# Patient Record
Sex: MALE | Race: WHITE | HISPANIC OR LATINO | Employment: PART TIME | URBAN - METROPOLITAN AREA
[De-identification: names, ages, dates, MRNs, and addresses within clinical notes are randomized per-mention and may not be internally consistent; named-entity substitution may affect disease eponyms.]

---

## 2017-11-01 ENCOUNTER — OFFICE VISIT (OUTPATIENT)
Dept: PULMONOLOGY | Facility: CLINIC | Age: 62
End: 2017-11-01
Payer: COMMERCIAL

## 2017-11-01 VITALS
HEIGHT: 71 IN | OXYGEN SATURATION: 96 % | RESPIRATION RATE: 18 BRPM | SYSTOLIC BLOOD PRESSURE: 140 MMHG | DIASTOLIC BLOOD PRESSURE: 82 MMHG | BODY MASS INDEX: 30.13 KG/M2 | HEART RATE: 56 BPM | WEIGHT: 215.19 LBS

## 2017-11-01 DIAGNOSIS — G47.30 SLEEP APNEA, UNSPECIFIED TYPE: Primary | ICD-10-CM

## 2017-11-01 DIAGNOSIS — I48.19 PERSISTENT ATRIAL FIBRILLATION: ICD-10-CM

## 2017-11-01 DIAGNOSIS — E11.9 TYPE 2 DIABETES MELLITUS WITHOUT COMPLICATION, WITHOUT LONG-TERM CURRENT USE OF INSULIN: ICD-10-CM

## 2017-11-01 DIAGNOSIS — I10 HYPERTENSION, UNSPECIFIED TYPE: ICD-10-CM

## 2017-11-01 DIAGNOSIS — E78.2 MIXED HYPERLIPIDEMIA: ICD-10-CM

## 2017-11-01 PROCEDURE — 99204 OFFICE O/P NEW MOD 45 MIN: CPT | Mod: 25,S$GLB,, | Performed by: NURSE PRACTITIONER

## 2017-11-01 PROCEDURE — 90471 IMMUNIZATION ADMIN: CPT | Mod: S$GLB,,, | Performed by: INTERNAL MEDICINE

## 2017-11-01 PROCEDURE — 90686 IIV4 VACC NO PRSV 0.5 ML IM: CPT | Mod: S$GLB,,, | Performed by: INTERNAL MEDICINE

## 2017-11-01 PROCEDURE — 99999 PR PBB SHADOW E&M-NEW PATIENT-LVL IV: CPT | Mod: PBBFAC,,, | Performed by: NURSE PRACTITIONER

## 2017-11-01 RX ORDER — METOPROLOL SUCCINATE 100 MG/1
100 TABLET, EXTENDED RELEASE ORAL DAILY
COMMUNITY
Start: 2016-03-22

## 2017-11-01 RX ORDER — METFORMIN HYDROCHLORIDE 500 MG/1
1 TABLET, FILM COATED, EXTENDED RELEASE ORAL DAILY
COMMUNITY
Start: 2017-01-16

## 2017-11-01 RX ORDER — ATORVASTATIN CALCIUM 80 MG/1
40 TABLET, FILM COATED ORAL DAILY
COMMUNITY
Start: 2017-01-13 | End: 2019-09-04

## 2017-11-01 NOTE — PATIENT INSTRUCTIONS
Visiting a Sleep Clinic    Your provider has scheduled you for a sleep study.   You should be receiving a phone call from the sleep lab shortly after your study has been approved by your insurance. Please make sure you have your current phone numbers in the [facility] system. If you do not hear from anyone in the next 10 -14 business days, please call the sleep lab at [phone] to schedule your sleep study. The sleep studies are performed at [facility] seven nights a week.  When you are scheduling your sleep study, they will also make you a follow up appointment with your provider. This follow up appointment will be 10-14 days after your sleep study to review the results. If it is noted that you do have sleep apnea on your initial sleep study, you may receive a call back for a second night study with the CPAP before you come back to the office.   Are you worried about having a sleep study? Talk to your healthcare provider if you have any concerns. Learn what to expect at the sleep clinic and try to relax before you come.  Before your study  Your healthcare provider will tell you how to prepare. Ask if you should take your usual medicines. Also:  · Avoid napping.  · Avoid caffeine and alcohol.  · Take a shower and wash your hair (dont use hair conditioner, hair spray, and skin lotions).  · Eat dinner before you come to the sleep clinic. Pack a snack if you need one before bedtime.  · Bring what will make you comfortable, such as your pajamas, robe, slippers, hygiene items, and even your own pillow.  What you can expect  When you arrive at the sleep clinic, you will usually be checked in by a . A specialized sleep technologist will meet you in your room. Then you may change into your nightclothes. Small sensors are placed on your head and body with tape and gel. The sensors are then plugged into a machine that will monitor your sleep. If you need to use a restroom, the sensors can  be unplugged. A camera in your room will record your body movements. The technologist will stay in a nearby room. If you need to talk to him or her, use the intercom.  What a sleep study does  A sleep study monitors all the stages of your sleep. To do this, the following are recorded:  · Eye movements  · Heart rate, brain waves, and muscle activity  · Level of oxygen in your blood  · Breathing and snoring  · Sudden leg or body movements  If you have breathing problems, Continuous Positive Airway Pressure (CPAP) may be used. CPAP is a device that can help you breathe by adding mild air pressure to your airway passages and improve your sleep. It may be used during the second half of your study or on another night.  Getting your results  The technologist can answer some of your questions about the sleep study. But only your healthcare provider can explain the results. He or she will have the report of your sleep study within 1 to 2 weeks. Then your treatment options can be discussed with your healthcare provider, or you may be referred to a sleep disorders specialist.  Date Last Reviewed: 8/9/2015 © 2000-2017 [employer]. [address] [place] [address]. All rights reserved. This information is not intended as a substitute for professional medical care. Always follow your healthcare professional's instructions.        What Are Snoring and Obstructive Sleep Apnea?  If youve ever had a stuffed-up nose, you know the feeling of trying to breathe through a very narrow passageway. This is what happens in your throat when you snore. While you sleep, structures in your throat partially block your air passage, making the passage narrow and hard to breathe through. If the entire passage becomes blocked and you cant breathe at all, you have sleep apnea.      Snoring Obstructive sleep apnea   Snoring  If your throat structures are too large or the muscles relax too much during sleep, the air passage may  be partially blocked. As air from the nose or mouth passes around this blockage, the throat structures vibrate, causing the familiar sound of snoring. At times, this sound can be so loud that snorers wake up others, or even themselves, during the night. Snoring gets worse as more and more of the air passage is blocked.  Obstructive sleep apnea  If the structures completely block the throat, air cant flow to the lungs at all. This is called apnea (meaning no breathing). Since the lungs arent getting fresh air, the brain tells the body to wake up just enough to tighten the muscles and unblock the air passage. With a loud gasp, breathing begins again. This process may be repeated over and over again throughout the night, making your sleep fragmented with a lighter stage of sleep. Even though you do not remember waking up many times during the night to a lighter sleep, you feel tired the next day. The lack of sleep and fresh air can also strain your lungs, heart, and other organs, leading to problems such as high blood pressure, heart attack, or stroke.  Problems in the nose and jaw  Problems in the structure of the nose may obstruct breathing. A crooked (deviated) septum or swollen turbinates can make snoring worse or lead to apnea. Also, a receding jaw may make the tongue sit too far back, so its more likely to block the airway when youre asleep.        Date Last Reviewed: 7/18/2015  © 9059-7186 [employer] [address] [place] [address], [place] [address]. All rights reserved. This information is not intended as a substitute for professional medical care. Always follow your healthcare professional's instructions.        Continuous Positive Air Pressure (CPAP)     A mask over the nose gently directs air into the throat to keep the airway open.   Continuous positive air pressure (CPAP) uses gentle air pressure to hold the airway open. CPAP is often the most effective treatment for sleep apnea and severe  snoring. It works very well for many people. But keep in mind that it can take several adjustments before the setup is right for you.  How CPAP works  The CPAP machine  is a small portable pump beside the bed. The pump sends air through a hose, which is held over your nose and mouth by a mask. Mild air pressure is gently pushed through your airway. The air pressure nudges sagging tissues aside. This widens the airway so you can breathe better. CPAP may be combined with other kinds of therapy for sleep apnea.  Types of air pressure treatments  There are different types of CPAP. Your doctor or CPAP technician will help you decide which type is best for you:  · Basic CPAP keeps the pressure constant all night long.  · A bilevel device (BiPAP) provides more pressure when you breathe in and less when you breathe out. A BiPAP machine also may be set to provide automatic breaths to maintain breathing if you stop breathing while sleeping.  · An autoCPAP device automatically adjusts pressure throughout the night and in response to changes such as body position, sleep stage, and snoring.  Date Last Reviewed: 8/10/2015   © 4745-8600 [employer]. [address] [place] [address], [place] [address]. All rights reserved. This information is not intended as a substitute for professional medical care. Always follow your healthcare professional's instructions.

## 2017-11-01 NOTE — PROGRESS NOTES
Subjective:      Patient ID: Raúl Llamas is a 62 y.o. male.    Patient Active Problem List   Diagnosis    Diabetes mellitus, type II    Mixed hyperlipidemia    Hypertension    Persistent atrial fibrillation       Problem list has been reviewed.    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    Patient has observed snoring, periods of not breathing, falling asleep while driving, watching television, awakening in the middle of the night because of urination, feels sleepy during the day, take naps during the day.  He has been told over the past 5 years he stops breathing during night. He will awake himself gasping for air over past 4-5 years with frequent awakenings up to 6 times a night.   His major concern is he is a  [employer] [address]  [employer], driving 45 minutes from work to home, he will notice dosing 4-5 times. He has had gone off the road, never had accident.   He is advised not to drive until sleep work is completed.   He is off work as a  until January 2017 related to vacation.   Patient reports non restful sleep.  He reports occassional morning headache.   He reports day time napping; duration 15 Minutes  [place] sleepiness score was 15.  Neck circumference is 47.5 cm. (18 3/4 inches).  He denies recent weight gain.  Mallampati score 4  Cardiovascular risk factors: diabetes, hypertension, hyperlipidemia, atrial fib  Bed time is 0800  Wake time is 0500  Sleep onset is within  15 Minutes.  Sleep maintenance difficulties related to frequent night time awakening and non-restful sleep  Wake after sleep onset occurs more than five times a night.  Nocturia occurs more than five times  a night,   Sleep aids :  NO  Dry mouth : YES  Sleep walking:  NO  Sleep talking :  NO  Sleep eating: NO  Vivid Dreams :  NO  Cataplexy :  NO  Hypnogogic hallucinations:   NO    STOP - BANG Questionnaire:   1. Snoring : Do you snore loudly ?     YES  2. Tired : Do you often feel tired, fatigued, or sleepy during daytime?   YES  3. Observed: Has anyone observed you stop breathing during your sleep?     YES  4. Blood pressure : Do you have or are you being treated for high blood pressure?    YES  5. BMI :BMI more than 35 kg/m2?   NO  6. Age : Age over 50 yr old?    YES  7. Neck circumference: Neck circumference greater than 40 cm?    YES  8. Gender: Gender male?    YES    SCORE: 7    High risk of RETA: Yes 5 - 8  Intermediate risk of RETA: Yes 3 - 4  Low risk of RETA: Yes 0 - 2    Previous Report Reviewed: outside reports from L medical examiner.    Past Medical History: The following portions of the patient's history were reviewed and updated as appropriate:   He  has a past surgical history that includes Wrist surgery (Left); Knee surgery (Left); Appendectomy; and Bladder surgery.  His family history includes Diabetes in his father; No Known Problems in his mother.  He  reports that he has been smoking.  He has been smoking about 0.25 packs per day. He has never used smokeless tobacco. He reports that he drinks alcohol. He reports that he does not use drugs.  He has a current medication list which includes the following prescription(s): atorvastatin, metformin, metoprolol succinate, and rivaroxaban.  He is allergic to tramadol..    Review of Systems   Constitutional: Negative for fever, chills, weight loss, weight gain, activity change, appetite change, fatigue and night sweats.   HENT: Negative for postnasal drip, rhinorrhea, sinus pressure, voice change and congestion.    Eyes: Negative for redness and itching.   Respiratory: Negative for snoring, cough, sputum production, chest tightness, shortness of breath,  "wheezing, orthopnea, asthma nighttime symptoms, dyspnea on extertion, use of rescue inhaler and somnolence.    Cardiovascular: Negative.  Negative for chest pain, palpitations and leg swelling.   Genitourinary: Negative for difficulty urinating and hematuria.   Endocrine: Negative for cold intolerance and heat intolerance.    Musculoskeletal: Negative for arthralgias, gait problem, joint swelling and myalgias.   Skin: Negative.    Gastrointestinal: Negative for nausea, vomiting, abdominal pain and acid reflux.   Neurological: Negative for dizziness, weakness, light-headedness and headaches.   Hematological: Negative for adenopathy. No excessive bruising.   All other systems reviewed and are negative.     Objective:     Physical Exam   Constitutional: He is oriented to person, place, and time. He appears well-developed and well-nourished.   HENT:   Head: Normocephalic and atraumatic.   Neck circumference is 47.5 cm. (18 3/4 inches).  Mallampati score 4       Eyes: Conjunctivae are normal.   Neck: Normal range of motion. Neck supple.   Cardiovascular: Normal rate, regular rhythm, normal heart sounds and intact distal pulses.    Pulmonary/Chest: Effort normal and breath sounds normal.   Abdominal: Soft. Bowel sounds are normal.   Musculoskeletal: Normal range of motion.   Neurological: He is alert and oriented to person, place, and time. He has normal reflexes.   Skin: Skin is warm and dry.   Psychiatric: He has a normal mood and affect. His behavior is normal. Judgment and thought content normal.   Vitals reviewed.    BP (!) 140/82 (BP Location: Right arm, Patient Position: Sitting)   Pulse (!) 56   Resp 18   Ht 5' 11" (1.803 m)   Wt 97.6 kg (215 lb 2.7 oz)   SpO2 96%   BMI 30.01 kg/m²     Personal Diagnostic Review  None    Assessment:     1. Sleep apnea, unspecified type    2. Persistent atrial fibrillation    3. Hypertension, unspecified type    4. Mixed hyperlipidemia    5. Type 2 diabetes mellitus without " complication, without long-term current use of insulin      Orders Placed This Encounter   Procedures    Polysomnogram (CPAP will be added if patient meets diagnostic criteria.)     Standing Status:   Future     Standing Expiration Date:   11/1/2018    Influenza - Quadrivalent (3 years & older) (PF)     Plan:     Discussed diagnosis, its evaluation, treatment and usual course. All questions answered.    Problem List Items Addressed This Visit     Diabetes mellitus, type II    Relevant Orders    Polysomnogram (CPAP will be added if patient meets diagnostic criteria.)    Hypertension    Relevant Orders    Polysomnogram (CPAP will be added if patient meets diagnostic criteria.)    Mixed hyperlipidemia    Relevant Orders    Polysomnogram (CPAP will be added if patient meets diagnostic criteria.)    Persistent atrial fibrillation    Relevant Orders    Polysomnogram (CPAP will be added if patient meets diagnostic criteria.)      Other Visit Diagnoses     Sleep apnea, unspecified type    -  Primary    Relevant Orders    Polysomnogram (CPAP will be added if patient meets diagnostic criteria.)        TIME SPENT WITH PATIENT:   Time spent 40 minutes in face to face  discussion concerning diagnosis, prognosis, review of lab and test results, benefits of treatment as well as management of disease, counseling of patient and coordination of care between various health  care providers . Greater than half the time spent was used for coordination of care and counseling of patient.     Return for Sleep Study Follow Up.

## 2017-11-25 ENCOUNTER — HOSPITAL ENCOUNTER (OUTPATIENT)
Dept: SLEEP MEDICINE | Facility: HOSPITAL | Age: 62
Discharge: HOME | End: 2017-11-25
Attending: INTERNAL MEDICINE
Payer: COMMERCIAL

## 2017-11-25 DIAGNOSIS — F51.04 PSYCHOPHYSIOLOGICAL INSOMNIA: ICD-10-CM

## 2017-11-25 DIAGNOSIS — I48.19 PERSISTENT ATRIAL FIBRILLATION: ICD-10-CM

## 2017-11-25 DIAGNOSIS — E11.9 TYPE 2 DIABETES MELLITUS WITHOUT COMPLICATION, WITHOUT LONG-TERM CURRENT USE OF INSULIN: ICD-10-CM

## 2017-11-25 DIAGNOSIS — E78.2 MIXED HYPERLIPIDEMIA: ICD-10-CM

## 2017-11-25 DIAGNOSIS — G47.33 OBSTRUCTIVE SLEEP APNEA: Primary | ICD-10-CM

## 2017-11-25 DIAGNOSIS — Z72.821 INADEQUATE SLEEP HYGIENE: ICD-10-CM

## 2017-11-25 DIAGNOSIS — I10 HYPERTENSION, UNSPECIFIED TYPE: ICD-10-CM

## 2017-11-25 PROCEDURE — 95811 POLYSOM 6/>YRS CPAP 4/> PARM: CPT | Mod: 26,,, | Performed by: PSYCHOLOGIST

## 2017-11-25 PROCEDURE — 95811 POLYSOM 6/>YRS CPAP 4/> PARM: CPT

## 2017-12-04 ENCOUNTER — OFFICE VISIT (OUTPATIENT)
Dept: PULMONOLOGY | Facility: CLINIC | Age: 62
End: 2017-12-04
Payer: COMMERCIAL

## 2017-12-04 VITALS
DIASTOLIC BLOOD PRESSURE: 90 MMHG | RESPIRATION RATE: 20 BRPM | HEART RATE: 62 BPM | HEIGHT: 71 IN | OXYGEN SATURATION: 96 % | SYSTOLIC BLOOD PRESSURE: 130 MMHG | BODY MASS INDEX: 28.86 KG/M2 | WEIGHT: 206.13 LBS

## 2017-12-04 DIAGNOSIS — F17.201 MILD TOBACCO USE DISORDER, IN EARLY REMISSION: ICD-10-CM

## 2017-12-04 DIAGNOSIS — G47.33 SEVERE OBSTRUCTIVE SLEEP APNEA: Primary | ICD-10-CM

## 2017-12-04 PROCEDURE — 99999 PR PBB SHADOW E&M-EST. PATIENT-LVL III: CPT | Mod: PBBFAC,,, | Performed by: NURSE PRACTITIONER

## 2017-12-04 PROCEDURE — 99213 OFFICE O/P EST LOW 20 MIN: CPT | Mod: S$GLB,,, | Performed by: NURSE PRACTITIONER

## 2017-12-04 NOTE — PATIENT INSTRUCTIONS
What Are Snoring and Obstructive Sleep Apnea?  If youve ever had a stuffed-up nose, you know the feeling of trying to breathe through a very narrow passageway. This is what happens in your throat when you snore. While you sleep, structures in your throat partially block your air passage, making the passage narrow and hard to breathe through. If the entire passage becomes blocked and you cant breathe at all, you have sleep apnea.      Snoring Obstructive sleep apnea   Snoring  If your throat structures are too large or the muscles relax too much during sleep, the air passage may be partially blocked. As air from the nose or mouth passes around this blockage, the throat structures vibrate, causing the familiar sound of snoring. At times, this sound can be so loud that snorers wake up others, or even themselves, during the night. Snoring gets worse as more and more of the air passage is blocked.  Obstructive sleep apnea  If the structures completely block the throat, air cant flow to the lungs at all. This is called apnea (meaning no breathing). Since the lungs arent getting fresh air, the brain tells the body to wake up just enough to tighten the muscles and unblock the air passage. With a loud gasp, breathing begins again. This process may be repeated over and over again throughout the night, making your sleep fragmented with a lighter stage of sleep. Even though you do not remember waking up many times during the night to a lighter sleep, you feel tired the next day. The lack of sleep and fresh air can also strain your lungs, heart, and other organs, leading to problems such as high blood pressure, heart attack, or stroke.  Problems in the nose and jaw  Problems in the structure of the nose may obstruct breathing. A crooked (deviated) septum or swollen turbinates can make snoring worse or lead to apnea. Also, a receding jaw may make the tongue sit too far back, so its more likely to block the airway when  youre asleep.        Date Last Reviewed: 7/18/2015  © 4822-3773 [employer]. [address] [place] [address]. All rights reserved. This information is not intended as a substitute for professional medical care. Always follow your healthcare professional's instructions.        Continuous Positive Air Pressure (CPAP)     A mask over the nose gently directs air into the throat to keep the airway open.   Continuous positive air pressure (CPAP) uses gentle air pressure to hold the airway open. CPAP is often the most effective treatment for sleep apnea and severe snoring. It works very well for many people. But keep in mind that it can take several adjustments before the setup is right for you.  How CPAP works  The CPAP machine  is a small portable pump beside the bed. The pump sends air through a hose, which is held over your nose and mouth by a mask. Mild air pressure is gently pushed through your airway. The air pressure nudges sagging tissues aside. This widens the airway so you can breathe better. CPAP may be combined with other kinds of therapy for sleep apnea.  Types of air pressure treatments  There are different types of CPAP. Your doctor or CPAP technician will help you decide which type is best for you:  · Basic CPAP keeps the pressure constant all night long.  · A bilevel device (BiPAP) provides more pressure when you breathe in and less when you breathe out. A BiPAP machine also may be set to provide automatic breaths to maintain breathing if you stop breathing while sleeping.  · An autoCPAP device automatically adjusts pressure throughout the night and in response to changes such as body position, sleep stage, and snoring.  Date Last Reviewed: 8/10/2015  © 1981-7402 [employer]. [address] [place], [place] [id number]. All rights reserved. This information is not intended as a substitute for professional medical care. Always follow your healthcare professional's  instructions.

## 2017-12-04 NOTE — ASSESSMENT & PLAN NOTE
PSG 11/25/2017 AHI 77.6   Oxygen desats corrected with CPAP in lab titration  Begin auto CPAP 6-16 cm

## 2017-12-04 NOTE — PROGRESS NOTES
Subjective:      Patient ID: Raúl Llamas is a 62 y.o. male.    Chief Complaint: Sleep Apnea      HPI: Raúl Llamas is here for follow up for RETA with review of PSG 11/25/17 that revealed severe sleep apnea AHI 77.6 with split night study.     Previous Report Reviewed: lab reports, office notes and radiology reports     Past Medical History: The following portions of the patient's history were reviewed and updated as appropriate:   He  has a past surgical history that includes Wrist surgery (Left); Knee surgery (Left); Appendectomy; and Bladder surgery.  His family history includes Diabetes in his father; No Known Problems in his mother.  He  reports that he quit smoking about 4 weeks ago. His smoking use included Cigarettes. He started smoking about 38 years ago. He smoked 0.25 packs per day. He has never used smokeless tobacco. He reports that he drinks alcohol. He reports that he does not use drugs.  He has a current medication list which includes the following prescription(s): atorvastatin, metformin, metoprolol succinate, and rivaroxaban.  He is allergic to tramadol..    The following portions of the patient's history were reviewed and updated as appropriate: allergies, current medications, past family history, past medical history, past social history, past surgical history and problem list.    Review of Systems   Constitutional: Negative for fever, chills, weight loss, weight gain, activity change, appetite change, fatigue and night sweats.   HENT: Negative for postnasal drip, rhinorrhea, sinus pressure, voice change and congestion.    Eyes: Negative for redness and itching.   Respiratory: Negative for snoring, cough, sputum production, chest tightness, shortness of breath, wheezing, orthopnea, asthma nighttime symptoms, dyspnea on extertion, use of rescue inhaler and somnolence.    Cardiovascular: Negative.  Negative for chest pain, palpitations and leg swelling.   Genitourinary: Negative for difficulty  "urinating and hematuria.   Endocrine: Negative for cold intolerance and heat intolerance.    Musculoskeletal: Negative for arthralgias, gait problem, joint swelling and myalgias.   Skin: Negative.    Gastrointestinal: Negative for nausea, vomiting, abdominal pain and acid reflux.   Neurological: Negative for dizziness, weakness, light-headedness and headaches.   Hematological: Negative for adenopathy. No excessive bruising.   All other systems reviewed and are negative.     Objective:     Physical Exam   Constitutional: He is oriented to person, place, and time. He appears well-developed and well-nourished. He is active and cooperative.  Non-toxic appearance. He does not appear ill. No distress.   HENT:   Head: Normocephalic and atraumatic.   Right Ear: External ear normal.   Left Ear: External ear normal.   Nose: Nose normal.   Mouth/Throat: Oropharynx is clear and moist. No oropharyngeal exudate.   Eyes: Conjunctivae are normal.   Neck: Normal range of motion. Neck supple.   Cardiovascular: Normal rate, regular rhythm, normal heart sounds and intact distal pulses.    Pulmonary/Chest: Effort normal and breath sounds normal.   Abdominal: Soft.   Musculoskeletal: He exhibits no edema.   Neurological: He is alert and oriented to person, place, and time. He has normal reflexes.   Skin: Skin is warm and dry.   Psychiatric: He has a normal mood and affect. His behavior is normal. Judgment and thought content normal.   Vitals reviewed.    Vitals:    12/04/17 0833   BP: (!) 130/90   Pulse: 62   Resp: 20   SpO2: 96%   Weight: 93.5 kg (206 lb 2.1 oz)   Height: 5' 11" (1.803 m)     body mass index is 28.75 kg/m².    Personal Diagnostic Review  The diagnostic polysomnography revealed a severe obstructive sleep apnea / hypopnea syndrome (A + H Index = 77.6  events / hr asleep with 63.9 respiratory event - arousals / hr asleep for the study, and no RERAs (respiratory effort - related  arousals). The mean SpO2 value was 93.2 %, " moderate, minimum oxygen saturation during sleep was 80.0 %, and waking  baseline SpO2 was 97 %. Sporadic, mild to moderately loud snoring was noted.  2. CPAP was initiated at 2:04 am. The titration polysomnography revealed that 12 cm H2O pressure (C - Flex 3, humidity 2),  the most effective pressure most completely tested, was nearly adequate, as it reduced the rate of respiratory events 90 % to  A + H Index = 7.4 events / hr asleep in 1.2 hrs sleep (0.6 hrs REM sleep). There were some periodic central sleep apneas,  with mixed apneas and central hypopneas, more than during the pre - CPAP PSG, but they did not appear to be of  Cheyne - Mclean morphology. Rule out treatment - emergent central sleep apnea subsequent to possible complex sleep  apnea. Snoring was improved to soft, but it was not eliminated at any pressure. AutoCPAP (4 cm - 20 cm) could be tried if  necessary.    Assessment:     1. Severe obstructive sleep apnea    2. Mild tobacco use disorder, in early remission        Orders Placed This Encounter   Procedures    CPAP/BIPAP SUPPLIES     90 day supply. 4 refills.     Order Specific Question:   Type of mask:     Answer:   FFM     Order Specific Question:   Headgear?     Answer:   Yes     Order Specific Question:   Tubing?     Answer:   Yes     Order Specific Question:   Humidifier chamber?     Answer:   Yes     Order Specific Question:   Chin strap?     Answer:   Yes     Order Specific Question:   Filters?     Answer:   Yes     Order Specific Question:   Length of need (1-99 months):     Answer:   99    CPAP FOR HOME USE     Order Specific Question:   Type:     Answer:   Auto CPAP     Order Specific Question:   Auto CPAP pressure setting range (cmH20):     Answer:   6-16     Order Specific Question:   Length of need (1-99 months):     Answer:   99     Order Specific Question:   Humidification:     Answer:   Heated     Order Specific Question:   Type of mask:     Answer:   FFM     Order Specific  Question:   Headgear?     Answer:   Yes     Order Specific Question:   Tubing?     Answer:   Yes     Order Specific Question:   Humidifier chamber?     Answer:   Yes     Order Specific Question:   Chin strap?     Answer:   Yes     Order Specific Question:   Filters?     Answer:   Yes     Plan:     Problem List Items Addressed This Visit     Mild tobacco use disorder, in early remission     Quit in November 2016          Severe obstructive sleep apnea - Primary     PSG 11/25/2017 AHI 77.6   Oxygen desats corrected with CPAP in lab titration  Begin auto CPAP 6-16 cm            Relevant Orders    CPAP/BIPAP SUPPLIES    CPAP FOR HOME USE         (DME - [facility]  Reviewed therapeutic goals for positive airway pressure therapy Auto CPAP  Ideal is usage 100% of nights for 6 - 8 hours per night. Minimum usage is 70% of night for at least 4 hours per night used. Pateint expressed understanding.     Return in about 7 weeks (around 1/22/2018) for CPAP compliance follow up after initial set up.     DOT form completed and patient hand carried today.

## 2017-12-27 ENCOUNTER — TELEPHONE (OUTPATIENT)
Dept: PULMONOLOGY | Facility: CLINIC | Age: 62
End: 2017-12-27

## 2019-02-19 ENCOUNTER — OFFICE VISIT (OUTPATIENT)
Dept: PULMONOLOGY | Facility: CLINIC | Age: 64
End: 2019-02-19
Payer: COMMERCIAL

## 2019-02-19 VITALS
SYSTOLIC BLOOD PRESSURE: 140 MMHG | HEART RATE: 72 BPM | WEIGHT: 212.31 LBS | RESPIRATION RATE: 18 BRPM | HEIGHT: 71 IN | DIASTOLIC BLOOD PRESSURE: 84 MMHG | BODY MASS INDEX: 29.72 KG/M2 | OXYGEN SATURATION: 95 %

## 2019-02-19 DIAGNOSIS — F17.201 MILD TOBACCO USE DISORDER, IN EARLY REMISSION: ICD-10-CM

## 2019-02-19 DIAGNOSIS — G47.33 OSA ON CPAP: Primary | ICD-10-CM

## 2019-02-19 DIAGNOSIS — K05.00 GINGIVITIS, ACUTE: ICD-10-CM

## 2019-02-19 PROCEDURE — 3008F PR BODY MASS INDEX (BMI) DOCUMENTED: ICD-10-PCS | Mod: CPTII,S$GLB,, | Performed by: NURSE PRACTITIONER

## 2019-02-19 PROCEDURE — 99999 PR PBB SHADOW E&M-EST. PATIENT-LVL III: ICD-10-PCS | Mod: PBBFAC,,, | Performed by: NURSE PRACTITIONER

## 2019-02-19 PROCEDURE — 3008F BODY MASS INDEX DOCD: CPT | Mod: CPTII,S$GLB,, | Performed by: NURSE PRACTITIONER

## 2019-02-19 PROCEDURE — 99214 OFFICE O/P EST MOD 30 MIN: CPT | Mod: S$GLB,,, | Performed by: NURSE PRACTITIONER

## 2019-02-19 PROCEDURE — 3077F PR MOST RECENT SYSTOLIC BLOOD PRESSURE >= 140 MM HG: ICD-10-PCS | Mod: CPTII,S$GLB,, | Performed by: NURSE PRACTITIONER

## 2019-02-19 PROCEDURE — 3079F DIAST BP 80-89 MM HG: CPT | Mod: CPTII,S$GLB,, | Performed by: NURSE PRACTITIONER

## 2019-02-19 PROCEDURE — 3077F SYST BP >= 140 MM HG: CPT | Mod: CPTII,S$GLB,, | Performed by: NURSE PRACTITIONER

## 2019-02-19 PROCEDURE — 99214 PR OFFICE/OUTPT VISIT, EST, LEVL IV, 30-39 MIN: ICD-10-PCS | Mod: S$GLB,,, | Performed by: NURSE PRACTITIONER

## 2019-02-19 PROCEDURE — 99999 PR PBB SHADOW E&M-EST. PATIENT-LVL III: CPT | Mod: PBBFAC,,, | Performed by: NURSE PRACTITIONER

## 2019-02-19 PROCEDURE — 3079F PR MOST RECENT DIASTOLIC BLOOD PRESSURE 80-89 MM HG: ICD-10-PCS | Mod: CPTII,S$GLB,, | Performed by: NURSE PRACTITIONER

## 2019-02-19 RX ORDER — CHLORHEXIDINE GLUCONATE ORAL RINSE 1.2 MG/ML
15 SOLUTION DENTAL 2 TIMES DAILY
Qty: 473 ML | Refills: 0 | Status: SHIPPED | OUTPATIENT
Start: 2019-02-19 | End: 2019-03-05

## 2019-02-19 RX ORDER — RIVAROXABAN 20 MG/1
20 TABLET, FILM COATED ORAL
COMMUNITY

## 2019-02-19 NOTE — ASSESSMENT & PLAN NOTE
Benefits not compliant Auto CPAP 6-14 cm   [place] 8   Average AHI 15.0  Auto-CPAP Summary  Auto-CPAP Mean Pressure 12.8 cmH2O  Auto-CPAP Peak Average Pressure 14.4 cmH2O  Average Device Pressure <= 90% of Time 15.0 cmH2O   Changed in clinic to CPAP 12 cm setting mean pressure and optimal setting with split night study.    Consideration for full nights titration if not improved with change of settings and improving compliance

## 2019-02-19 NOTE — PROGRESS NOTES
Subjective:      Patient ID: Raúl Llamas is a 63 y.o. male.    Chief Complaint: Sleep Apnea    HPI: Raúl Llamas is here for follow up for RETA and CPAP initial complaince assessment.  He is on Auto CPAP of 6-16 cmH2O pressure with apneic index (AHI) 15.0 events an hour.   He is not compliant with CPAP use. Complaince download today reveals 33.3% of days with greater than 4 hours of device use.   Patient reports benefit from CPAP use and denies snoring and excessive daytime sleepiness.  Patient reports complaint of high pressures lifting mask off face and feeling uncomfortable during night. also dry mouth, has tried biotene with some improvement. otherwise able to fall asleep no problem with cpap. Full face mask is used.   [place] 8  Patient also compliant of gum pain and swelling over past 2 weeks. He was not sure if CPAP.  Patient presents with red swollen gums with plaque, advised peridex and follow up with dentist.     Previous Report Reviewed: lab reports and office notes     Past Medical History: The following portions of the patient's history were reviewed and updated as appropriate:   He  has a past surgical history that includes Wrist surgery (Left); Knee surgery (Left); Appendectomy; and Bladder surgery.  His family history includes Diabetes in his father; No Known Problems in his mother.  He  reports that he quit smoking about 7 weeks ago. His smoking use included cigarettes. He started smoking about 40 years ago. He smoked 0.25 packs per day. he has never used smokeless tobacco. He reports that he drinks alcohol. He reports that he does not use drugs.  He has a current medication list which includes the following prescription(s): atorvastatin, metformin, metoprolol succinate, rivaroxaban, and chlorhexidine.  He is allergic to tramadol.    The following portions of the patient's history were reviewed and updated as appropriate: allergies, current medications, past family history, past medical history,  "past social history, past surgical history and problem list.    Review of Systems   Constitutional: Negative for fever, chills, weight loss, weight gain, activity change, appetite change, fatigue and night sweats.   HENT: Negative for postnasal drip, rhinorrhea, sinus pressure, voice change and congestion.    Eyes: Negative for redness and itching.   Respiratory: Negative for snoring, cough, sputum production, chest tightness, shortness of breath, wheezing, orthopnea, asthma nighttime symptoms, dyspnea on extertion, use of rescue inhaler and somnolence.    Cardiovascular: Negative.  Negative for chest pain, palpitations and leg swelling.   Genitourinary: Negative for difficulty urinating and hematuria.   Endocrine: Negative for cold intolerance and heat intolerance.    Musculoskeletal: Negative for arthralgias, gait problem, joint swelling and myalgias.   Skin: Negative.    Gastrointestinal: Negative for nausea, vomiting, abdominal pain and acid reflux.   Neurological: Negative for dizziness, weakness, light-headedness and headaches.   Hematological: Negative for adenopathy. No excessive bruising.   All other systems reviewed and are negative.     Objective:   BP (!) 140/84   Pulse 72   Resp 18   Ht 5' 11" (1.803 m)   Wt 96.3 kg (212 lb 4.9 oz)   SpO2 95%   BMI 29.61 kg/m²   Physical Exam   Constitutional: He is oriented to person, place, and time. He appears well-developed and well-nourished. He is active and cooperative.  Non-toxic appearance. He does not appear ill. No distress.   HENT:   Head: Normocephalic and atraumatic.   Right Ear: External ear normal.   Left Ear: External ear normal.   Nose: Nose normal.   Mouth/Throat: Oropharynx is clear and moist. No oropharyngeal exudate.   Lower greater than upper gum lines with swelling, mild erythema and plaque.    Eyes: Conjunctivae are normal.   Neck: Normal range of motion. Neck supple.   Cardiovascular: Normal rate, regular rhythm, normal heart sounds and " "intact distal pulses.   Pulmonary/Chest: Effort normal and breath sounds normal.   Abdominal: Soft.   Musculoskeletal: He exhibits no edema.   Neurological: He is alert and oriented to person, place, and time.   Skin: Skin is warm and dry.   Psychiatric: He has a normal mood and affect. His behavior is normal. Judgment and thought content normal.   Vitals reviewed.    Personal Diagnostic Review  CPAP download  APAP 6 -16 cm  Compliance Summary  1/10/2019 - 2/8/2019 (30 days)  Days with Device Usage 14 days  Days without Device Usage 16 days  Percent Days with Device Usage 46.7%  Cumulative Usage 3 days 23 mins. 45 secs.  Maximum Usage (1 Day) 7 hrs. 58 mins. 3 secs.  Average Usage (All Days) 2 hrs. 24 mins. 47 secs.  Average Usage (Days Used) 5 hrs. 10 mins. 16 secs.  Minimum Usage (1 Day) 2 hrs. 52 mins. 3 secs.  Percent of Days with Usage >= 4 Hours 33.3%  Percent of Days with Usage < 4 Hours 66.7%  Date Range  Total Blower Time 3 days 23 mins. 48 secs.  Average AHI 15.0  Auto-CPAP Summary  Auto-CPAP Mean Pressure 12.8 cmH2O  Auto-CPAP Peak Average Pressure 14.4 cmH2O  Average Device Pressure <= 90% of Time 15.0 cmH2O  Average Time in Large Leak Per Day 6 mins.    Assessment:     1. RETA on CPAP    2. Mild tobacco use disorder, in early remission    3. Gingivitis, acute        Orders Placed This Encounter   Procedures    HME - OTHER     Change to CPAP 12 cm  HME: [facility]     Order Specific Question:   Type of Equipment:     Answer:   cpap     Order Specific Question:   Height:     Answer:   5' 11" (1.803 m)     Order Specific Question:   Weight:     Answer:   96.3 kg (212 lb 4.9 oz)     Plan:     Problem List Items Addressed This Visit     Mild tobacco use disorder, in early remission     Quit cigarettes completely 1/1/2019          RETA on CPAP - Primary (Chronic)     Benefits not compliant Auto CPAP 6-14 cm   [place] 8   Average AHI 15.0  Auto-CPAP Summary  Auto-CPAP Mean Pressure 12.8 cmH2O  Auto-CPAP Peak " Average Pressure 14.4 cmH2O  Average Device Pressure <= 90% of Time 15.0 cmH2O   Changed in clinic to CPAP 12 cm setting mean pressure and optimal setting with split night study.    Consideration for full nights titration if not improved with change of settings and improving compliance            Relevant Orders    HME - OTHER      Other Visit Diagnoses     Gingivitis, acute        peridex and fu with dentist     Relevant Medications    chlorhexidine (PERIDEX) 0.12 % solution         (DME) - [facility]  Reviewed therapeutic goals for positive airway pressure therapy CPAP  Ideal is usage 100% of nights for 6 - 8 hours per night. Minimum usage is 70% of night for at least 4 hours per night used.     Follow-up in about 6 weeks (around 4/2/2019) for CPAP compliance download not compliant at initial.

## 2019-04-04 ENCOUNTER — TELEPHONE (OUTPATIENT)
Dept: PULMONOLOGY | Facility: CLINIC | Age: 64
End: 2019-04-04

## 2019-04-23 ENCOUNTER — TELEPHONE (OUTPATIENT)
Dept: PULMONOLOGY | Facility: CLINIC | Age: 64
End: 2019-04-23

## 2019-04-23 NOTE — TELEPHONE ENCOUNTER
----- Message from Serenity Paz sent at 4/23/2019  3:36 PM CDT -----  Contact: Pt  Pt needs to have the sleep machine readings and numbers faxed over to [phone] and if there are any questions give pt a call at ..[phone] (home)

## 2019-05-31 ENCOUNTER — OFFICE VISIT (OUTPATIENT)
Dept: PULMONOLOGY | Facility: CLINIC | Age: 64
End: 2019-05-31
Payer: COMMERCIAL

## 2019-05-31 VITALS
DIASTOLIC BLOOD PRESSURE: 80 MMHG | BODY MASS INDEX: 31.64 KG/M2 | WEIGHT: 226 LBS | HEIGHT: 71 IN | SYSTOLIC BLOOD PRESSURE: 145 MMHG | OXYGEN SATURATION: 98 % | RESPIRATION RATE: 18 BRPM | HEART RATE: 94 BPM

## 2019-05-31 DIAGNOSIS — G47.33 OSA ON CPAP: Primary | Chronic | ICD-10-CM

## 2019-05-31 PROCEDURE — 3079F DIAST BP 80-89 MM HG: CPT | Mod: CPTII,S$GLB,, | Performed by: NURSE PRACTITIONER

## 2019-05-31 PROCEDURE — 3077F SYST BP >= 140 MM HG: CPT | Mod: CPTII,S$GLB,, | Performed by: NURSE PRACTITIONER

## 2019-05-31 PROCEDURE — 99214 PR OFFICE/OUTPT VISIT, EST, LEVL IV, 30-39 MIN: ICD-10-PCS | Mod: S$GLB,,, | Performed by: NURSE PRACTITIONER

## 2019-05-31 PROCEDURE — 99214 OFFICE O/P EST MOD 30 MIN: CPT | Mod: S$GLB,,, | Performed by: NURSE PRACTITIONER

## 2019-05-31 PROCEDURE — 3079F PR MOST RECENT DIASTOLIC BLOOD PRESSURE 80-89 MM HG: ICD-10-PCS | Mod: CPTII,S$GLB,, | Performed by: NURSE PRACTITIONER

## 2019-05-31 PROCEDURE — 3077F PR MOST RECENT SYSTOLIC BLOOD PRESSURE >= 140 MM HG: ICD-10-PCS | Mod: CPTII,S$GLB,, | Performed by: NURSE PRACTITIONER

## 2019-05-31 PROCEDURE — 99999 PR PBB SHADOW E&M-EST. PATIENT-LVL IV: CPT | Mod: PBBFAC,,, | Performed by: NURSE PRACTITIONER

## 2019-05-31 PROCEDURE — 3008F BODY MASS INDEX DOCD: CPT | Mod: CPTII,S$GLB,, | Performed by: NURSE PRACTITIONER

## 2019-05-31 PROCEDURE — 99999 PR PBB SHADOW E&M-EST. PATIENT-LVL IV: ICD-10-PCS | Mod: PBBFAC,,, | Performed by: NURSE PRACTITIONER

## 2019-05-31 PROCEDURE — 3008F PR BODY MASS INDEX (BMI) DOCUMENTED: ICD-10-PCS | Mod: CPTII,S$GLB,, | Performed by: NURSE PRACTITIONER

## 2019-05-31 RX ORDER — LOSARTAN POTASSIUM 100 MG/1
100 TABLET ORAL DAILY
COMMUNITY

## 2019-05-31 RX ORDER — NIFEDIPINE 90 MG/1
90 TABLET, FILM COATED, EXTENDED RELEASE ORAL DAILY
COMMUNITY

## 2019-05-31 RX ORDER — METHOCARBAMOL 500 MG/1
500 TABLET, FILM COATED ORAL 4 TIMES DAILY
COMMUNITY

## 2019-05-31 RX ORDER — CARVEDILOL 25 MG/1
25 TABLET ORAL 2 TIMES DAILY WITH MEALS
COMMUNITY
End: 2022-04-07 | Stop reason: SDUPTHER

## 2019-05-31 NOTE — PROGRESS NOTES
Subjective:      Patient ID: Raúl Llamas is a 63 y.o. male.    Chief Complaint: Sleep Apnea    HPI: Raúl Llamas is here for follow up for RETA with CPAP complaince assessment patient scheduled himself related to falling asleep.   He is on CPAP of 12 cmH2O pressure which is not optimally controlling sleep apnea with apneic index (AHI) 32.5 events an hour.   He is not compliant with CPAP use. Complaince download today reveals 53.3% of days with greater than 4 hours of device use.   Patient reports no benefit from CPAP use, he is feeling sleepy driving over past 3 months. He has gained 13 lbs over past 3 months.   He wanted lower pressures so placed on CPAP 12 cm near optimal on split night study.   Full face mask is used.   [place] 13       Previous Report Reviewed: lab reports and office notes     Past Medical History: The following portions of the patient's history were reviewed and updated as appropriate:   He  has a past surgical history that includes Wrist surgery (Left); Knee surgery (Left); Appendectomy; and Bladder surgery.  His family history includes Diabetes in his father; No Known Problems in his mother.  He  reports that he quit smoking about 4 months ago. His smoking use included cigarettes. He started smoking about 40 years ago. He smoked 0.25 packs per day. He has never used smokeless tobacco. He reports that he drinks alcohol. He reports that he does not use drugs.  He has a current medication list which includes the following prescription(s): acetaminophen, carvedilol, losartan, metformin, methocarbamol, nifedipine, rivaroxaban, atorvastatin, and metoprolol succinate.  He is allergic to tramadol..    The following portions of the patient's history were reviewed and updated as appropriate: allergies, current medications, past family history, past medical history, past social history, past surgical history and problem list.    Review of Systems   Constitutional: Negative for fever, chills, weight  "loss, weight gain, activity change, appetite change, fatigue and night sweats.   HENT: Negative for postnasal drip, rhinorrhea, sinus pressure, voice change and congestion.    Eyes: Negative for redness and itching.   Respiratory: Negative for snoring, cough, sputum production, chest tightness, shortness of breath, wheezing, orthopnea, asthma nighttime symptoms, dyspnea on extertion, use of rescue inhaler and somnolence.    Cardiovascular: Negative.  Negative for chest pain, palpitations and leg swelling.   Genitourinary: Negative for difficulty urinating and hematuria.   Endocrine: Negative for cold intolerance and heat intolerance.    Musculoskeletal: Negative for arthralgias, gait problem, joint swelling and myalgias.   Skin: Negative.    Gastrointestinal: Negative for nausea, vomiting, abdominal pain and acid reflux.   Neurological: Negative for dizziness, weakness, light-headedness and headaches.   Hematological: Negative for adenopathy. No excessive bruising.   All other systems reviewed and are negative.     Objective:   BP (!) 145/80   Pulse 94   Resp 18   Ht 5' 11" (1.803 m)   Wt 102.5 kg (225 lb 15.5 oz)   SpO2 98%   BMI 31.52 kg/m²   Physical Exam   Constitutional: He is oriented to person, place, and time. He appears well-developed and well-nourished. He is active and cooperative.  Non-toxic appearance. He does not appear ill. No distress.   HENT:   Head: Normocephalic and atraumatic.   Right Ear: External ear normal.   Left Ear: External ear normal.   Nose: Nose normal.   Mouth/Throat: Oropharynx is clear and moist. No oropharyngeal exudate.   Eyes: Conjunctivae are normal.   Neck: Normal range of motion. Neck supple.   Cardiovascular: Normal rate, regular rhythm, normal heart sounds and intact distal pulses.   Pulmonary/Chest: Effort normal and breath sounds normal.   Abdominal: Soft.   Musculoskeletal: He exhibits no edema.   Neurological: He is alert and oriented to person, place, and time. " "  Skin: Skin is warm and dry.   Psychiatric: He has a normal mood and affect. His behavior is normal. Judgment and thought content normal.   Vitals reviewed.    Personal Diagnostic Review  CPAP 12 cm  Compliance Summary  4/22/2019 - 5/21/2019 (30 days)  Days with Device Usage 23 days  Days without Device Usage 7 days  Percent Days with Device Usage 76.7%  Cumulative Usage 5 days 1 hrs. 28 mins.  Maximum Usage (1 Day) 9 hrs. 54 mins. 5 secs.  Average Usage (All Days) 4 hrs. 2 mins. 56 secs.  Average Usage (Days Used) 5 hrs. 16 mins. 52 secs.  Minimum Usage (1 Day) 2 hrs. 6 mins. 43 secs.  Percent of Days with Usage >= 4 Hours 53.3%  Percent of Days with Usage < 4 Hours 46.7%  Date Range  Total Blower Time 5 days 1 hrs. 28 mins. 12 secs.  CPAP Summary  Average Time in Large Leak Per Day 5 mins. 8 secs.  Average AHI 32.5  CPAP 12.0 cmH2O    Assessment:     1. RETA on CPAP      Orders Placed This Encounter   Procedures    HME - OTHER     Changed in clinic auto CPAP 8 - 20 cm     Order Specific Question:   Type of Equipment:     Answer:   cpap     Order Specific Question:   Height:     Answer:   5' 11" (1.803 m)     Order Specific Question:   Weight:     Answer:   102.5 kg (225 lb 15.5 oz)     Plan:     Problem List Items Addressed This Visit     RETA on CPAP - Primary (Chronic)     No longer benefitting since changed to CPAP 12 cm that was requested by the patient to lower his pressure is  Increased daytime sleepiness with average AHI 32.5 on CPAP 12 cm  Patient is willing to increase pressures and attempt to obtain benefit he saw when he was on higher pressures  Change in clinic to auto CPAP 8-20 cm  Full face mask remains tolerated  Consideration for a full night's titration if not improving with changes settings patient may need BiPAP as his complain the past was difficulty breathing out against higher pressures.  He request trial of increased pressures before proceeding with retitration         Relevant Orders    HME " - OTHER        TIME SPENT WITH PATIENT: Time spent:30 minutes in face to face  discussion concerning diagnosis, prognosis, review of lab and test results, benefits of treatment as well as management of disease, counseling of patient and coordination of care between various health  care providers . Greater than half the time spent was used for coordination of care and counseling of patient.        (DME) - [facility]  Reviewed therapeutic goals for positive airway pressure therapy Auto CPAP  Ideal is usage 100% of nights for 6 - 8 hours per night. Minimum usage is 70% of night for at least 4 hours per night used.     Follow up in about 3 months (around 8/31/2019) for CPAP compliance download not compliant .

## 2019-06-01 NOTE — ASSESSMENT & PLAN NOTE
No longer benefitting since changed to CPAP 12 cm that was requested by the patient to lower his pressure is  Increased daytime sleepiness with average AHI 32.5 on CPAP 12 cm  Patient is willing to increase pressures and attempt to obtain benefit he saw when he was on higher pressures  Change in clinic to auto CPAP 8-20 cm  Full face mask remains tolerated  Consideration for a full night's titration if not improving with changes settings patient may need BiPAP as his complain the past was difficulty breathing out against higher pressures.  He request trial of increased pressures before proceeding with retitration

## 2019-06-13 ENCOUNTER — TELEPHONE (OUTPATIENT)
Dept: PULMONOLOGY | Facility: CLINIC | Age: 64
End: 2019-06-13

## 2019-06-13 NOTE — TELEPHONE ENCOUNTER
----- Message from Janice Bear sent at 6/13/2019 12:52 PM CDT -----  Contact: pt   Call pt regarding sleep machine. Pt states that the water in the machine is boiling and he cant use it.     .[phone] (home)

## 2019-06-18 NOTE — TELEPHONE ENCOUNTER
Spoke with patient, he spoke with Anita. He has an appointment with Anita 6/19 to check out CPAP machine.   Reports has had recent flare of atrial fib seeing new cardiologist, cant remember name.

## 2019-08-01 ENCOUNTER — TELEPHONE (OUTPATIENT)
Dept: PULMONOLOGY | Facility: CLINIC | Age: 64
End: 2019-08-01

## 2019-08-01 NOTE — TELEPHONE ENCOUNTER
----- Message from Heena Holman MA sent at 8/1/2019  3:38 PM CDT -----  Regarding: FW: remote cpap   Patient been in the hospital due to heart problems. He now taking 12 medication. Gerber states when trying tomuse the machine he's having panic attacks at night , Still have'nt used the machine much at all.  ----- Message -----  From: Lakeshia Knight NP  Sent: 7/31/2019  12:35 PM  To: [facility]AL[facility] Staff  Subject: RE: remote cpap                                  Please have patient bring in his CPAP machine as soon as possible to evaluate change to auto CPAP.  This is a 2nd requests he needs to bring his machine in in order to obtain a download.  Thank you      ----- Message -----  From: Lakeshia Knight NP  Sent: 6/18/2019   7:54 AM  To: Lakeshia Knight NP  Subject: RE: remote cpap                                      ----- Message -----  From: Lakeshia Knight NP  Sent: 5/31/2019   4:25 PM  To: Lakeshia Knight NP  Subject: remote cpap                                      Remote on change from CPAP 12 cm to auto CPAP 8 -20 cm  With AhI 32.5 on CPAP 12 cm

## 2019-08-01 NOTE — TELEPHONE ENCOUNTER
Telephone to update, mailbox is full unable to leave a message.  He has follow-up scheduled, September 2019 for CPAP download.

## 2019-09-04 ENCOUNTER — OFFICE VISIT (OUTPATIENT)
Dept: PULMONOLOGY | Facility: CLINIC | Age: 64
End: 2019-09-04
Payer: COMMERCIAL

## 2019-09-04 VITALS
HEART RATE: 60 BPM | RESPIRATION RATE: 16 BRPM | SYSTOLIC BLOOD PRESSURE: 125 MMHG | WEIGHT: 212.19 LBS | DIASTOLIC BLOOD PRESSURE: 72 MMHG | OXYGEN SATURATION: 96 % | BODY MASS INDEX: 29.71 KG/M2 | HEIGHT: 71 IN

## 2019-09-04 DIAGNOSIS — I48.19 PERSISTENT ATRIAL FIBRILLATION: ICD-10-CM

## 2019-09-04 DIAGNOSIS — F41.9 ANXIETY: ICD-10-CM

## 2019-09-04 DIAGNOSIS — G47.33 OSA ON CPAP: Chronic | ICD-10-CM

## 2019-09-04 DIAGNOSIS — G47.33 SEVERE OBSTRUCTIVE SLEEP APNEA: Primary | ICD-10-CM

## 2019-09-04 DIAGNOSIS — E11.9 TYPE 2 DIABETES MELLITUS WITHOUT COMPLICATION, WITHOUT LONG-TERM CURRENT USE OF INSULIN: ICD-10-CM

## 2019-09-04 DIAGNOSIS — I10 HYPERTENSION, UNSPECIFIED TYPE: ICD-10-CM

## 2019-09-04 DIAGNOSIS — E78.2 MIXED HYPERLIPIDEMIA: ICD-10-CM

## 2019-09-04 DIAGNOSIS — F41.0 PANIC ATTACKS: ICD-10-CM

## 2019-09-04 DIAGNOSIS — F51.04 PSYCHOPHYSIOLOGICAL INSOMNIA: ICD-10-CM

## 2019-09-04 DIAGNOSIS — G47.31 CENTRAL SLEEP APNEA: ICD-10-CM

## 2019-09-04 PROCEDURE — 3008F PR BODY MASS INDEX (BMI) DOCUMENTED: ICD-10-PCS | Mod: CPTII,S$GLB,, | Performed by: NURSE PRACTITIONER

## 2019-09-04 PROCEDURE — 1101F PT FALLS ASSESS-DOCD LE1/YR: CPT | Mod: CPTII,S$GLB,, | Performed by: NURSE PRACTITIONER

## 2019-09-04 PROCEDURE — 3008F BODY MASS INDEX DOCD: CPT | Mod: CPTII,S$GLB,, | Performed by: NURSE PRACTITIONER

## 2019-09-04 PROCEDURE — 3074F PR MOST RECENT SYSTOLIC BLOOD PRESSURE < 130 MM HG: ICD-10-PCS | Mod: CPTII,S$GLB,, | Performed by: NURSE PRACTITIONER

## 2019-09-04 PROCEDURE — 1101F PR PT FALLS ASSESS DOC 0-1 FALLS W/OUT INJ PAST YR: ICD-10-PCS | Mod: CPTII,S$GLB,, | Performed by: NURSE PRACTITIONER

## 2019-09-04 PROCEDURE — 99214 OFFICE O/P EST MOD 30 MIN: CPT | Mod: S$GLB,,, | Performed by: NURSE PRACTITIONER

## 2019-09-04 PROCEDURE — 99999 PR PBB SHADOW E&M-EST. PATIENT-LVL IV: ICD-10-PCS | Mod: PBBFAC,,, | Performed by: NURSE PRACTITIONER

## 2019-09-04 PROCEDURE — 99999 PR PBB SHADOW E&M-EST. PATIENT-LVL IV: CPT | Mod: PBBFAC,,, | Performed by: NURSE PRACTITIONER

## 2019-09-04 PROCEDURE — 3074F SYST BP LT 130 MM HG: CPT | Mod: CPTII,S$GLB,, | Performed by: NURSE PRACTITIONER

## 2019-09-04 PROCEDURE — 3078F DIAST BP <80 MM HG: CPT | Mod: CPTII,S$GLB,, | Performed by: NURSE PRACTITIONER

## 2019-09-04 PROCEDURE — 3078F PR MOST RECENT DIASTOLIC BLOOD PRESSURE < 80 MM HG: ICD-10-PCS | Mod: CPTII,S$GLB,, | Performed by: NURSE PRACTITIONER

## 2019-09-04 PROCEDURE — 99214 PR OFFICE/OUTPT VISIT, EST, LEVL IV, 30-39 MIN: ICD-10-PCS | Mod: S$GLB,,, | Performed by: NURSE PRACTITIONER

## 2019-09-04 RX ORDER — ATORVASTATIN CALCIUM 80 MG/1
80 TABLET, FILM COATED ORAL
COMMUNITY
Start: 2019-06-18

## 2019-09-04 RX ORDER — LEVOTHYROXINE SODIUM 88 UG/1
88 TABLET ORAL
COMMUNITY
Start: 2019-08-22

## 2019-09-04 RX ORDER — TRAZODONE HYDROCHLORIDE 50 MG/1
50 TABLET ORAL
COMMUNITY
Start: 2019-07-15

## 2019-09-04 RX ORDER — FLUOXETINE 10 MG/1
CAPSULE ORAL
COMMUNITY
Start: 2019-08-22

## 2019-09-04 RX ORDER — FUROSEMIDE 20 MG/1
20 TABLET ORAL
COMMUNITY
Start: 2019-08-21

## 2019-09-04 NOTE — PROGRESS NOTES
Subjective:      Patient ID: Lorenzo Llamas is a 64 y.o. male.    Chief Complaint: Sleep Apnea    HPI: Lorenzo Llamas is here for follow up for RETA with follow-up CPAP complaince assessment after change to auto CPAP 8-20 cm.  He was on CPAP 12 cm in past related to patient wanted lower CPAP pressures.  Not well controlled on CPAP 12 cm with AHI 32.5.   He is presently on Auto CPAP of 8-20 cm cmH2O pressure which is not optimally controlling sleep apnea with apneic index (AHI) 31.6 events an hour.   He is not compliant with CPAP use. Complaince download today reveals 30.0% of days with greater than 4 hours of device use.   Patient reports intolerant to CPAP, after wearing CPAP for 4 hr he feels a smothering sensation and inadequate relief.  His girlfriend is reporting seeing patient struggling to breath with CPAP in use. He also feels pressure building up so much pressure creates mask leak and then he awakens feeling choking.   He reports the times he is able to use CPAP over 4 hours there is benefit feels more refreshed.  Less daytime sleepiness. He is able to drive to work without dozing off.   Full face mask is used.   [place] [address]      11/25/2017 PSG split night study severe obstructive sleep apnea / hypopnea syndrome (A + H Index = 77.6 events / hr asleep). 12 cm CPAP (C - Flex 3, humidity 2) is recommended, but BiPAP titration might prove needed if central sleep apnea emerge (these often disappear after initial CPAP titration as the patient adjusts to CPAP over time).    Previous Report Reviewed: lab reports and office notes     Past Medical History: The following portions of the patient's history were reviewed and updated as appropriate:   He  has a past surgical history that includes Wrist surgery (Left); Knee surgery (Left); Appendectomy; and Bladder surgery.  His family history includes Diabetes in his father; No Known Problems in his mother.  He  reports that he quit smoking about 8 months ago. His smoking  "use included cigarettes. He started smoking about 40 years ago. He smoked 0.25 packs per day. He has never used smokeless tobacco. He reports that he drinks alcohol. He reports that he does not use drugs.  He has a current medication list which includes the following prescription(s): acetaminophen, atorvastatin, carvedilol, fluoxetine, furosemide, levothyroxine, losartan, metformin, methocarbamol, metoprolol succinate, nifedipine, rivaroxaban, and trazodone.  He is allergic to tramadol..    The following portions of the patient's history were reviewed and updated as appropriate: allergies, current medications, past family history, past medical history, past social history, past surgical history and problem list.    Review of Systems   Constitutional: Negative for fever, chills, weight loss, weight gain, activity change, appetite change, fatigue and night sweats.   HENT: Negative for postnasal drip, rhinorrhea, sinus pressure, voice change and congestion.    Eyes: Negative for redness and itching.   Respiratory: Negative for snoring, cough, sputum production, chest tightness, shortness of breath, wheezing, orthopnea, asthma nighttime symptoms, dyspnea on extertion, use of rescue inhaler and somnolence.    Cardiovascular: Negative.  Negative for chest pain, palpitations and leg swelling.   Genitourinary: Negative for difficulty urinating and hematuria.   Endocrine: Negative for cold intolerance and heat intolerance.    Musculoskeletal: Negative for arthralgias, gait problem, joint swelling and myalgias.   Skin: Negative.    Gastrointestinal: Negative for nausea, vomiting, abdominal pain and acid reflux.   Neurological: Negative for dizziness, weakness, light-headedness and headaches.   Hematological: Negative for adenopathy. No excessive bruising.   All other systems reviewed and are negative.     Objective:   /72   Pulse 60   Resp 16   Ht 5' 11" (1.803 m)   Wt 96.2 kg (212 lb 3.1 oz)   SpO2 96%   BMI " 29.59 kg/m²   Physical Exam   Constitutional: He is oriented to person, place, and time. He appears well-developed and well-nourished. He is active and cooperative.  Non-toxic appearance. He does not appear ill. No distress.   HENT:   Head: Normocephalic and atraumatic.   Right Ear: External ear normal.   Left Ear: External ear normal.   Nose: Nose normal.   Mouth/Throat: Oropharynx is clear and moist. No oropharyngeal exudate.   Eyes: Conjunctivae are normal.   Neck: Normal range of motion. Neck supple.   Cardiovascular: Normal rate, regular rhythm, normal heart sounds and intact distal pulses.   Pulmonary/Chest: Effort normal and breath sounds normal.   Abdominal: Soft.   Musculoskeletal: He exhibits no edema.   Neurological: He is alert and oriented to person, place, and time.   Skin: Skin is warm and dry.   Psychiatric: He has a normal mood and affect. His behavior is normal. Judgment and thought content normal.   Vitals reviewed.    Personal Diagnostic Review  CPAP download  APAP 8-20 cm  Compliance Summary  8/5/2019 - 9/3/2019 (30 days)  Days with Device Usage 22 days  Days without Device Usage 8 days  Percent Days with Device Usage 73.3%  Cumulative Usage 3 days 4 hrs. 48 mins. 38 secs.  Maximum Usage (1 Day) 7 hrs. 33 mins. 42 secs.  Average Usage (All Days) 2 hrs. 33 mins. 37 secs.  Average Usage (Days Used) 3 hrs. 29 mins. 29 secs.  Minimum Usage (1 Day) 48 mins. 31 secs.  Percent of Days with Usage >= 4 Hours 30.0%  Percent of Days with Usage < 4 Hours 70.0%  Date Range  Average AHI 31.6  Auto-CPAP Summary  Auto-CPAP Mean Pressure 12.2 cmH2O  Auto-CPAP Peak Average Pressure 16.7 cmH2O  Average Device Pressure <= 90% of Time 14.7 cmH2O  Average Time in Large Leak Per Day 47 mins. 30 secs.    PSG 11/27/2017  SLEEP QUALITY: Extremely nonrestorative sleep due to extremely poor sleep continuity and extremely poor sleep depth.  - Sleep Continuity: Extremely high 78.5 transient arousals / hr asleep (63.9 / hr  respiratory, 0.0 / hr PLMS, 14.6 / hr  spontaneous).  - Sleep Depth: Very high 31.6 % Stage 1 NREM sleep, very low 0.2 % stage 3 / 4 NREM sleep.   REM SLEEP: No REM sleep, not un common in the first half of the night in an initial clinical nocturnal polysomnography.  RESPIRATORY: The overall apnea-hypopnea index (AHI) was 77.6 events /hr asleep. Mr. Llamas had 85 hypopneas, 40  obstructive sleep apneas, 23 central sleep apneas, and 22 mixed sleep apneas. Sporadic , mild to moderately loud snoring  was noted. Analysis of continuous oxygen saturations showed a mean SpO2 value of 93.2 % for the study, with a minimum  oxygen saturation during sleep of 80.0 % and a waking baseline SpO2 = 97 %. Oxygen saturations were ? 88 % for 19.7  minutes of the time spent asleep. For other respiratory disturbances, there was no Cheyne Mclean breathing, and 0 respiratory  effort-related arousals (RERAs).   Respiratory events had no supine positional tendency (supine AHI= 89.4; left-side AHI= 47.0 /hr; and prone AHI = 84.3 /hr).   There were no REM sleep events because there was no REM sleep.  CPAP: Initiated at 2:04 a.m. See treatment recommendations, above, for summary and conclusions.  The treatment portion of the study began at 2:04:27 AM and ended at 5:45:39 AM, for a recording time of 221.2  minutes (3.7 hrs). The sleep period lasted 196.3 minutes (3.3 hrs) and the total sleep time (TST) was 164.0 minutes (2.7 hrs),  which resulted in a sleep efficiency (TST÷TRT) of 74.1 %. The sleep latency (SL) was 24.9 minutes, and the latency to the first  occurrence of Stage R was 24.0 minutes. There were 3 Stage R periods observed on this study night, 24 awakenings and 84  total stage transitions.  The percentage of Sleep Period Time in each stage was: Stage N1 = 14.3 %; Stage N2 = 22.4 %; Stage N3 = 18.9 %  and Stage R= 28.0 %. Time awake after sleep onset (WASO) was 16.4 % of the sleep period.    Assessment:     1. Severe obstructive  sleep apnea    2. RETA on CPAP    3. Persistent atrial fibrillation    4. Central sleep apnea    5. Hypertension, unspecified type    6. Type 2 diabetes mellitus without complication, without long-term current use of insulin    7. Mixed hyperlipidemia    8. Psychophysiological insomnia    9. Anxiety    10. Panic attacks      Orders Placed This Encounter   Procedures    CPAP Titration (Must have dx of RETA from previous sleep study.)     11/27/2017 split night study, intolerance to CPAP, persistent afib, central apnea, proceed with pap retitration consideration for BIPAP or adaptive servo ventilation (ASV).     Standing Status:   Future     Standing Expiration Date:   9/4/2020     Plan:     Problem List Items Addressed This Visit     Psychophysiological insomnia    Relevant Orders    CPAP Titration (Must have dx of RETA from previous sleep study.)    Persistent atrial fibrillation    Relevant Orders    CPAP Titration (Must have dx of RETA from previous sleep study.)    RETA on CPAP (Chronic)    Relevant Orders    CPAP Titration (Must have dx of RETA from previous sleep study.)    Mixed hyperlipidemia    Relevant Orders    CPAP Titration (Must have dx of RETA from previous sleep study.)    Hypertension    Relevant Orders    CPAP Titration (Must have dx of RETA from previous sleep study.)    Diabetes mellitus, type II    Relevant Orders    CPAP Titration (Must have dx of RETA from previous sleep study.)      Other Visit Diagnoses     Severe obstructive sleep apnea    -  Primary    Relevant Orders    CPAP Titration (Must have dx of RETA from previous sleep study.)    Central sleep apnea        Relevant Orders    CPAP Titration (Must have dx of RETA from previous sleep study.)    Anxiety        Relevant Orders    CPAP Titration (Must have dx of RETA from previous sleep study.)    Panic attacks        Relevant Orders    CPAP Titration (Must have dx of RETA from previous sleep study.)        Plan summary   patient remains intolerant  to CPAP use with inadequate control of RETA.   inadequate titration on split night study 11/2017.   central apneas    patient has continued to have persistent AFib   proceed with PAP titration determine if BiPAP or ASV would be more optimal mode of treatment of RETA.              Follow up for CPAP titration results review.

## 2019-09-28 ENCOUNTER — HOSPITAL ENCOUNTER (OUTPATIENT)
Dept: SLEEP MEDICINE | Facility: HOSPITAL | Age: 64
Discharge: HOME | End: 2019-09-28
Attending: NURSE PRACTITIONER
Payer: MEDICARE

## 2019-09-28 DIAGNOSIS — E11.9 TYPE 2 DIABETES MELLITUS WITHOUT COMPLICATION, WITHOUT LONG-TERM CURRENT USE OF INSULIN: ICD-10-CM

## 2019-09-28 DIAGNOSIS — E78.2 MIXED HYPERLIPIDEMIA: ICD-10-CM

## 2019-09-28 DIAGNOSIS — F51.04 PSYCHOPHYSIOLOGICAL INSOMNIA: ICD-10-CM

## 2019-09-28 DIAGNOSIS — I48.19 PERSISTENT ATRIAL FIBRILLATION: ICD-10-CM

## 2019-09-28 DIAGNOSIS — G47.33 OSA ON CPAP: Primary | Chronic | ICD-10-CM

## 2019-09-28 DIAGNOSIS — F41.9 ANXIETY: ICD-10-CM

## 2019-09-28 DIAGNOSIS — I10 HYPERTENSION, UNSPECIFIED TYPE: ICD-10-CM

## 2019-09-28 DIAGNOSIS — G47.39 TREATMENT-EMERGENT CENTRAL SLEEP APNEA: ICD-10-CM

## 2019-09-28 DIAGNOSIS — F41.0 PANIC ATTACKS: ICD-10-CM

## 2019-09-28 DIAGNOSIS — Z72.821 INADEQUATE SLEEP HYGIENE: ICD-10-CM

## 2019-09-28 PROCEDURE — 95811 PR POLYSOMNOGRAPHY W/CPAP: ICD-10-PCS | Mod: 26,,, | Performed by: PSYCHOLOGIST

## 2019-09-28 PROCEDURE — 95811 POLYSOM 6/>YRS CPAP 4/> PARM: CPT

## 2019-09-28 PROCEDURE — 95811 POLYSOM 6/>YRS CPAP 4/> PARM: CPT | Mod: 26,,, | Performed by: PSYCHOLOGIST

## 2019-10-03 NOTE — PROCEDURES
Patient Name: Lorenzo Llamas  Date of Report: 10-3-19     Date of PS2019   [facility] No.: [id number]   : 1955                          Time of PS:17:25 PM - 5:18:22 AM  Sex:  Male   Age:  64   Weight:  212.0 lbs Height:  5  11             Type of PSG:  CPAP re-titration    REASONS FOR REFERRAL: Mr. Llamas is a 64 year old male, referred for polysomnography  by Lakeshia Knight NP, to determine if he needs a change in CPAP pressure. His split - night dx PSG was on 17, with an A + H Index = 77.6 events / hr asleep, severe OSAHS; his CPAP titration PSG revealed that the best CPAP pressure was 12 cm, though it was not optimal (non Cheyne - Mclean central apneas and mixed apneas, persistent mild snoring).  Neither his initial 12 cm CPAP nor the recent 8 cm - 20 cm auto PAP was adequate or well - tolerated or reliably used. The former averaged AHI=32.5, and the latter AHI=31.6, neither at all effective.  A re-titration was deemed necessary.      STUDY PARAMETERS: This study involved analysis of the patient's sleep pattern while breathing was assisted. The study was performed with a sleep technologist in attendance for the entire test period, with video monitoring throughout the study, and routine laboratory clinical parameters recorded:  NOTE: The polysomnography electrophysiological record for the patient has been reviewed in its entirety by Dr. Grewal.    SUMMARY STATEMENTS  DIAGNOSTIC IMPRESSIONS  G47.33  /  327.23  Severe Obstructive Sleep Apnea, Adult (OSAHS)  G47.39  /  327.29  Treatment - Emergent Central Sleep Apnea  F51.04   /  307.42  Psychophysiological Insomnia (stress - related and / or conditioned; with depression and anxiety)   Z72.821 /  V69.4  Inadequate Sleep Hygiene  G25.81   / 333.99  r/o Restless Legs Syndrome (RLS)  K21.9     /  530.1  r/o Sleep - Related Gastroesophageal Reflux      PRIMARY TREATMENT RECOMMENDATIONS  Treat, or refer to Sleep Disorders Center.  1. CPAP  was initiated at  9:17 pm.  The titration polysomnography revealed that 17 cm H2O pressure (C - Flex 3, humidity 2), the most effective pressure most completely tested, was optimal, as it reduced the rate of respiratory events 95 % to A + H Index = 3.6 events / hr asleep in 1.4 hrs sleep (0.3 hrs REM sleep).  Snoring was reduced to mild and sporadic at 14 cm to 20 cm,  but  was not eliminated at any pressure tested.  Higher pressure also could be successful (22 cm A + H I = 2.3, with 0.4 hrs REM sleep in 1.3 hrs sleep). AutoCPAP (4 cm - 20 cm) could be tried if necessary (was not effective in recent use)..    The overall A + H Index was 17.1 / hr asleep, with  8.1 respiratory event - related arousals / hr asleep (and no RERAs) for the titration trial.  The mean SpO2 value was 94.1 % throughout the study, moderate, with a minimum oxygen saturation during sleep of 71.0 %  (waking baseline SpO2 was 99 %).   A medium ResMed Air Fit  F20  full -  face  CPAP mask was used and was well - tolerated.  Please see PAP trial  outcomes table, below.          2. Weight loss to the normal range is recommended as it can decrease respiratory events and snoring in overweight patients.  3. The following changes in sleep hygiene / sleep - related behavior are recommended after medical treatments are successful   Regular bedtimes and wake times, including weekends: Total sleep time / night should not be more than one hour more             than usual, and bedtime or wake time should not be more than one hour earlier or later than usual.     Do not attempt to make up lost sleep by extending sleep periods.     Avoid naps; none longer than 20 min or later than mid - afternoon.    SECONDARY TREATMENT RECOMMENDATIONS  Treat, or refer to [facility] if problems are not satisfactorily resolved by the above.  1. If  insomnia persists after treatments for medical sleep disorders (RETA) have proven effective, and RLS has been ruled out or effectively  treated, recommend  follow - up inquiry regarding frequency, duration and nature of reported sleep loss, delayed sleep onset, poor sleep maintenance and involuntary early awakening (stress - related and / or conditioned psychophysiological insomnia) and referral for behavioral and cognitive / behavioral treatment of insomnia, as indicated.  Please see SDI  2. Consider behavioral and cognitive / behavioral treatments for stress, anxiety and depression to complement the medication and to increase the probability of long - term adaptive change.  Sleep might be expected to further improve.  3. Review the basis for prescribing antidepressant medication.  Sleep disorders of the type and magnitude Mr. Llamas has frequently produce many of the signs and symptoms of depression.  4. Follow - up inquiry regarding possible sleep - related gastroesophageal reflux (please see SDI).    See below for a complete interpretation of data from the polysomnography and Sleep Disorders Inventory.     Thank you for referring this patient to the [facility].      John Grewal, Ph.D., ABPP; Diplomate, American Board of Sleep Medicine

## 2019-10-04 ENCOUNTER — TELEPHONE (OUTPATIENT)
Dept: PULMONOLOGY | Facility: CLINIC | Age: 64
End: 2019-10-04

## 2019-10-04 DIAGNOSIS — G47.33 OSA ON CPAP: Primary | ICD-10-CM

## 2019-10-04 DIAGNOSIS — G47.39 TREATMENT-EMERGENT CENTRAL SLEEP APNEA: Chronic | ICD-10-CM

## 2019-10-04 NOTE — TELEPHONE ENCOUNTER
Telephone to discuss CPAP re-titration results.  With CPAP 17 cm optimal.  Call to advised patient would make the change he was not available.  Unable to leave a message voicemail is full. Sent info via [facility]. With consideration to change to auto CPAP 4-20 cm or auto CPAP 8-20 cm (not effective at previous download 9/4/2019 AHI 31.6) if 17 cm not well tolerated, as patient had difficulty with higher cpap pressures in past.     Orders Placed This Encounter   Procedures    HME - OTHER     Please change remotely to CPAP 17 cm with ramp of 5 cm x 30 minutes.   HME: [facility]     Order Specific Question:   Type of Equipment:     Answer:   CPAP     Order Specific Question:   Height:     Answer:   5 ft 11     Order Specific Question:   Weight:     Answer:   212 lb     Order Specific Question:   Does patient have medical equipment at home?     Answer:   CPAP     1. RETA on CPAP  HME - OTHER   2. Treatment-emergent central sleep apnea  HME - OTHER         CPAP titration 9/28/2019  CPAP was initiated at 9:17 pm. The titration polysomnography revealed that 17 cm H2O pressure (C - Flex 3, humidity 2), the most effective pressure most completely tested, was optimal, as it reduced the rate of respiratory events 95 % to A + H Index = 3.6 events /hr asleep in 1.4 hrs sleep (0.3 hrs REM sleep). Snoring was reduced to mild and sporadic at 14 cm to 20 cm, but was not eliminated at any pressure tested. Higher pressure also could be successful (22 cm A + H I = 2.3, with 0.4 hrs REM sleep in 1.3 hrs sleep). AutoCPAP (4 cm - 20 cm) could be tried if necessary (was not effective in recent use)..  The overall A + H Index was 17.1 / hr asleep, with 8.1 respiratory event - related arousals / hr asleep (and no RERAs) for the titration trial. The mean SpO2 value was 94.1 % throughout the study, moderate, with a minimum oxygen saturation during sleep of 71.0 % (waking baseline SpO2 was 99 %).  A medium ResMed Air Fit F20 full - face CPAP mask  was used and was well - tolerated.  RESPIRATORY: The overall apnea-hypopnea index (AHI) was 17.1 events /hr asleep. Snoring was reduced to soft and sporadic from 14 cm to 20 cm, but was not eliminated at any pressure. Analysis of continuous oxygen saturations showed a mean SpO2 value of 94.1 % for the study, moderate, with a minimum oxygen saturation during sleep of 71.0 %, and a waking baseline of 99 %. Oxygen saturations were ? 88 % for 19.9 minutes of the time spent asleep. For other respiratory disturbances, there was no Cheyne Mclean breathing, and 0 respiratory effort-related arousals (RERAs).

## 2019-10-14 ENCOUNTER — TELEPHONE (OUTPATIENT)
Dept: PULMONOLOGY | Facility: CLINIC | Age: 64
End: 2019-10-14

## 2019-10-14 DIAGNOSIS — G47.33 OSA ON CPAP: Primary | ICD-10-CM

## 2019-10-14 NOTE — TELEPHONE ENCOUNTER
Telephone to discuss CPAP re-titration results.  With CPAP 17 cm optimal.   With consideration to change to auto CPAP 4-20 cm or auto CPAP 8-20 cm (not effective at previous download 9/4/2019 AHI 31.6) if 17 cm not well tolerated, as patient had difficulty with higher cpap pressures in past.   He states he willing to try CPAP 17 cm. He feels improved when wearing CPAP.     Orders placed 10/4 for [facility] hme to make change remotely. CPAP 17 cm with ramp of 5 cm x 30 min.    Patient advised instead of taking mask off when he feels pressure is high at night he should hit the ramp button.    Plan office follow up compliance dnld 4 weeks. Needs latest afternoon appointment.

## 2020-01-13 ENCOUNTER — OFFICE VISIT (OUTPATIENT)
Dept: PULMONOLOGY | Facility: CLINIC | Age: 65
End: 2020-01-13
Payer: COMMERCIAL

## 2020-01-13 VITALS
OXYGEN SATURATION: 98 % | BODY MASS INDEX: 29.8 KG/M2 | RESPIRATION RATE: 16 BRPM | SYSTOLIC BLOOD PRESSURE: 120 MMHG | HEART RATE: 62 BPM | WEIGHT: 212.88 LBS | HEIGHT: 71 IN | DIASTOLIC BLOOD PRESSURE: 82 MMHG

## 2020-01-13 DIAGNOSIS — G47.33 OSA ON CPAP: Primary | ICD-10-CM

## 2020-01-13 DIAGNOSIS — G47.39 TREATMENT-EMERGENT CENTRAL SLEEP APNEA: Chronic | ICD-10-CM

## 2020-01-13 PROCEDURE — 99999 PR PBB SHADOW E&M-EST. PATIENT-LVL IV: CPT | Mod: PBBFAC,,, | Performed by: NURSE PRACTITIONER

## 2020-01-13 PROCEDURE — 3074F SYST BP LT 130 MM HG: CPT | Mod: CPTII,S$GLB,, | Performed by: NURSE PRACTITIONER

## 2020-01-13 PROCEDURE — 3079F DIAST BP 80-89 MM HG: CPT | Mod: CPTII,S$GLB,, | Performed by: NURSE PRACTITIONER

## 2020-01-13 PROCEDURE — 99214 OFFICE O/P EST MOD 30 MIN: CPT | Mod: S$GLB,,, | Performed by: NURSE PRACTITIONER

## 2020-01-13 PROCEDURE — 1101F PT FALLS ASSESS-DOCD LE1/YR: CPT | Mod: CPTII,S$GLB,, | Performed by: NURSE PRACTITIONER

## 2020-01-13 PROCEDURE — 3008F PR BODY MASS INDEX (BMI) DOCUMENTED: ICD-10-PCS | Mod: CPTII,S$GLB,, | Performed by: NURSE PRACTITIONER

## 2020-01-13 PROCEDURE — 99214 PR OFFICE/OUTPT VISIT, EST, LEVL IV, 30-39 MIN: ICD-10-PCS | Mod: S$GLB,,, | Performed by: NURSE PRACTITIONER

## 2020-01-13 PROCEDURE — 1101F PR PT FALLS ASSESS DOC 0-1 FALLS W/OUT INJ PAST YR: ICD-10-PCS | Mod: CPTII,S$GLB,, | Performed by: NURSE PRACTITIONER

## 2020-01-13 PROCEDURE — 99999 PR PBB SHADOW E&M-EST. PATIENT-LVL IV: ICD-10-PCS | Mod: PBBFAC,,, | Performed by: NURSE PRACTITIONER

## 2020-01-13 PROCEDURE — 3079F PR MOST RECENT DIASTOLIC BLOOD PRESSURE 80-89 MM HG: ICD-10-PCS | Mod: CPTII,S$GLB,, | Performed by: NURSE PRACTITIONER

## 2020-01-13 PROCEDURE — 3074F PR MOST RECENT SYSTOLIC BLOOD PRESSURE < 130 MM HG: ICD-10-PCS | Mod: CPTII,S$GLB,, | Performed by: NURSE PRACTITIONER

## 2020-01-13 PROCEDURE — 3008F BODY MASS INDEX DOCD: CPT | Mod: CPTII,S$GLB,, | Performed by: NURSE PRACTITIONER

## 2020-01-13 NOTE — ASSESSMENT & PLAN NOTE
On CPAP 8-20 cm   AHI 15.5  Full face mask  Hme [facility]   Changed to CPAP 17 cm, retitration 9/28/2019 revealed CPAP 17 cm optimal   Change to nasal mask once approved by [facility] in 2-3 weeks on CPAP 17 cm

## 2020-01-13 NOTE — PROGRESS NOTES
Subjective:      Patient ID: Lorenzo Llamas is a 64 y.o. male.    Chief Complaint: Sleep Apnea    HPI: Lorenzo Llamas is here for follow up for RETA with 4 month CPAP complaince assessment   He is on Auto CPAP of 8-20 cmH2O pressure with apneic index (AHI) 15.5 events an hour.   He is NOT compliant with CPAP use. Complaince download today reveals 3.3 % of days with greater than 4 hours of device use.   Patient reports benefit from CPAP use and denies snoring or excessive daytime sleepiness even with 2-3 hours of CPAP use. No longer feels sleepy driving.  He continues to feel intolerant to CPAP and does not like awakening feeling like he is not breathing when CPAP is in use.   When he feels sensation of awakening not breathing he takes mask off and does not put back on.  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   He would like to try nasal CPAP.   CPAP retitration 9/28/2019 revealed CPAP 17 cm optimal   CPAP 17 cm was attempted change by Selam Bryan. Anita discovered changing did not communicate to machine.  So patient was never placed on CPAP 17 cm in October 2019.   Anita made change today remotely and updated modem.   He needs prior authorization from [employer] once approved then can change to Nasal mask he is requesting today. Currently using Full face mask.   [place] 8    Previous Report Reviewed: lab reports and office notes     Past Medical History: The following portions of the patient's history were reviewed and updated as appropriate:   He  has a past surgical history that includes Wrist surgery (Left); Knee surgery (Left); Appendectomy; and Bladder surgery.  His family history includes Diabetes in his father; No Known Problems in his mother.  He  reports that he quit smoking about a year ago. His smoking use included cigarettes. He started smoking about 41 years ago. He smoked 0.25 packs per day. He has never used smokeless tobacco. He reports that he drinks  "alcohol. He reports that he does not use drugs.  He has a current medication list which includes the following prescription(s): acetaminophen, atorvastatin, carvedilol, fluoxetine, furosemide, levothyroxine, losartan, metformin, methocarbamol, metoprolol succinate, nifedipine, rivaroxaban, and trazodone.  He is allergic to tramadol..    The following portions of the patient's history were reviewed and updated as appropriate: allergies, current medications, past family history, past medical history, past social history, past surgical history and problem list.    Review of Systems   Constitutional: Negative for fever, chills, weight loss, weight gain, activity change, appetite change, fatigue and night sweats.   HENT: Negative for postnasal drip, rhinorrhea, sinus pressure, voice change and congestion.    Eyes: Negative for redness and itching.   Respiratory: Negative for snoring, cough, sputum production, chest tightness, shortness of breath, wheezing, orthopnea, asthma nighttime symptoms, dyspnea on extertion, use of rescue inhaler and somnolence.    Cardiovascular: Negative.  Negative for chest pain, palpitations and leg swelling.   Genitourinary: Negative for difficulty urinating and hematuria.   Endocrine: Negative for cold intolerance and heat intolerance.    Musculoskeletal: Negative for arthralgias, gait problem, joint swelling and myalgias.   Skin: Negative.    Gastrointestinal: Negative for nausea, vomiting, abdominal pain and acid reflux.   Neurological: Negative for dizziness, weakness, light-headedness and headaches.   Hematological: Negative for adenopathy. No excessive bruising.   All other systems reviewed and are negative.     Objective:   /82   Pulse 62   Resp 16   Ht 5' 11" (1.803 m)   Wt 96.6 kg (212 lb 13.7 oz)   SpO2 98%   BMI 29.69 kg/m²   Physical Exam   Constitutional: He is oriented to person, place, and time. He appears well-developed and well-nourished. He is active and " cooperative.  Non-toxic appearance. He does not appear ill. No distress.   HENT:   Head: Normocephalic and atraumatic.   Right Ear: External ear normal.   Left Ear: External ear normal.   Nose: Nose normal.   Mouth/Throat: Oropharynx is clear and moist. No oropharyngeal exudate.   Eyes: Conjunctivae are normal.   Neck: Normal range of motion. Neck supple.   Cardiovascular: Normal rate, regular rhythm, normal heart sounds and intact distal pulses.   Pulmonary/Chest: Effort normal and breath sounds normal.   Abdominal: Soft.   Musculoskeletal: He exhibits no edema.   Neurological: He is alert and oriented to person, place, and time.   Skin: Skin is warm and dry.   Psychiatric: He has a normal mood and affect. His behavior is normal. Judgment and thought content normal.   Vitals reviewed.    Personal Diagnostic Review  CPAP download  APAP 8-20 cm  Compliance Summary  12/14/2019 - 1/12/2020 (30 days)  Days with Device Usage 6 days  Days without Device Usage 24 days  Percent Days with Device Usage 20.0%  Cumulative Usage 1 day 1 hrs. 17 mins. 50 secs.  Maximum Usage (1 Day) 10 hrs. 44 mins. 36 secs.  Average Usage (All Days) 50 mins. 35 secs.  Average Usage (Days Used) 4 hrs. 12 mins. 58 secs.  Minimum Usage (1 Day) 2 hrs. 2 mins. 3 secs.  Percent of Days with Usage >= 4 Hours 3.3%  Percent of Days with Usage < 4 Hours 96.7%   Date Range  Average AHI 15.5  Auto-CPAP Summary  Auto-CPAP Mean Pressure 11.9 cmH2O  Auto-CPAP Peak Average Pressure 13.2 cmH2O  Average Device Pressure <= 90% of Time 14.9 cmH2O  Average Time in Large Leak Per Day 6 mins. 40 secs.    Note 10/14/2019     Telephone to discuss CPAP re-titration results.  With CPAP 17 cm optimal.   With consideration to change to auto CPAP 4-20 cm or auto CPAP 8-20 cm (not effective at previous download 9/4/2019 AHI 31.6) if 17 cm not well tolerated, as patient had difficulty with higher cpap pressures in past.   He states he willing to try CPAP 17 cm. He feels  improved when wearing CPAP.      Orders placed 10/4 for [facility] to make change remotely. CPAP 17 cm with ramp of 5 cm x 30 min.     Patient advised instead of taking mask off when he feels pressure is high at night he should hit the ramp button.     Plan office follow up compliance dnld 4 weeks. Needs latest afternoon appointment.           Assessment:     1. RETA on CPAP    2. Treatment-emergent central sleep apnea        Orders Placed This Encounter   Procedures    CPAP/BIPAP SUPPLIES     Benefits  Updated supply order  Patient requesting trial of nasal mask  HME: [facility]   Scooter Howard 1/13/2020 for mask fitting     Order Specific Question:   Type of mask:     Answer:   Nasal     Order Specific Question:   Headgear?     Answer:   Yes     Order Specific Question:   Tubing?     Answer:   Yes     Order Specific Question:   Humidifier chamber?     Answer:   Yes     Order Specific Question:   Chin strap?     Answer:   Yes     Order Specific Question:   Filters?     Answer:   Yes     Order Specific Question:   Cushions?     Answer:   Yes     Order Specific Question:   Length of need (1-99 months):     Answer:   99     Plan:     Problem List Items Addressed This Visit     Treatment-emergent central sleep apnea (Chronic)     On CPAP 8-20 cm   AHI 15.5  Full face mask  [facility]   Changed to CPAP 17 cm, retitration 9/28/2019 revealed CPAP 17 cm optimal   Change to nasal mask once approved by [facility] in 2-3 weeks on CPAP 17 cm             Relevant Orders    CPAP/BIPAP SUPPLIES      Other Visit Diagnoses     RETA on CPAP    -  Primary    Relevant Orders    CPAP/BIPAP SUPPLIES      TIME SPENT WITH PATIENT: Time spent:25 minutes in face to face  discussion concerning diagnosis, prognosis, review of lab and test results, benefits of treatment as well as management of disease, counseling of patient and coordination of care between various health  care providers . Greater than half the time spent was used for  coordination of care and counseling of patient.     Follow up in about 16 weeks (around 5/4/2020) for CPAP 4 month compliance download. needs cdl renewed May 2020

## 2020-01-21 ENCOUNTER — TELEPHONE (OUTPATIENT)
Dept: PULMONOLOGY | Facility: CLINIC | Age: 65
End: 2020-01-21

## 2020-01-21 NOTE — TELEPHONE ENCOUNTER
----- Message from Cecilio Veloz sent at 1/20/2020  4:27 PM CST -----  Contact: pt   Pt would like cb, states he has been waiting on call about cpap and hasn't gotten a reply.         [phone]

## 2020-02-10 ENCOUNTER — TELEPHONE (OUTPATIENT)
Dept: PULMONOLOGY | Facility: CLINIC | Age: 65
End: 2020-02-10

## 2020-02-11 NOTE — TELEPHONE ENCOUNTER
Spoke with patient he received nasal mask 5 days ago from [facility].  He is very pleased with nasal mask fit, now able to use CPAP all night and now awakens refreshed and no day time sleepiness.   Request patient bring in machine in 4 weeks mid march for download on change to CPAP 17 cm.  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

## 2020-02-19 ENCOUNTER — TELEPHONE (OUTPATIENT)
Dept: PULMONOLOGY | Facility: CLINIC | Age: 65
End: 2020-02-19

## 2020-02-19 NOTE — TELEPHONE ENCOUNTER
----- Message from Vincenzo Abreu sent at 2/19/2020 11:23 AM CST -----  Contact: [facility]  Pt would like to be called back regarding C PAP supplies including mask.    Pt can be reached at [phone] ext [phone].    Thank You.

## 2020-02-19 NOTE — TELEPHONE ENCOUNTER
Returnd lalo to [facility] and spoke Mrs Drake needed a copy of order for patient to received his cpap supplies. Faxed order [phone]

## 2020-04-17 ENCOUNTER — TELEPHONE (OUTPATIENT)
Dept: PULMONOLOGY | Facility: CLINIC | Age: 65
End: 2020-04-17

## 2020-05-13 ENCOUNTER — TELEPHONE (OUTPATIENT)
Dept: PULMONOLOGY | Facility: CLINIC | Age: 65
End: 2020-05-13

## 2020-05-13 NOTE — TELEPHONE ENCOUNTER
Non smoker  No falls in last 6 months   No new allergies   Medications reviewed    120/75  212lb  5'11

## 2020-05-21 ENCOUNTER — TELEPHONE (OUTPATIENT)
Dept: PULMONOLOGY | Facility: CLINIC | Age: 65
End: 2020-05-21

## 2020-05-21 NOTE — TELEPHONE ENCOUNTER
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

## 2020-05-21 NOTE — TELEPHONE ENCOUNTER
Returned patient call states he has estrella with Anita in DME on Tuesday   Download cpap reading from his machine . Patient states having problems with machine recording dl.

## 2020-06-16 ENCOUNTER — TELEPHONE (OUTPATIENT)
Dept: PULMONOLOGY | Facility: CLINIC | Age: 65
End: 2020-06-16

## 2020-06-16 NOTE — TELEPHONE ENCOUNTER
----- Message from Donna Gunn sent at 6/16/2020  3:56 PM CDT -----  Type:  Patient Returning Call    Who Called:Lorenzo  Who Left Message for Patient:  Does the patient know what this is regarding?:  Would the patient rather a call back or a response via [facility]? call  Best Call Back Number:[phone]    Additional Information: Stated he has been waiting on a call since yesterday regarding medical card

## 2021-04-06 ENCOUNTER — TELEPHONE (OUTPATIENT)
Dept: PULMONOLOGY | Facility: CLINIC | Age: 66
End: 2021-04-06

## 2021-04-07 ENCOUNTER — OFFICE VISIT (OUTPATIENT)
Dept: PULMONOLOGY | Facility: CLINIC | Age: 66
End: 2021-04-07
Payer: MEDICARE

## 2021-04-07 VITALS
WEIGHT: 209.44 LBS | OXYGEN SATURATION: 97 % | HEIGHT: 71 IN | DIASTOLIC BLOOD PRESSURE: 64 MMHG | SYSTOLIC BLOOD PRESSURE: 102 MMHG | BODY MASS INDEX: 29.32 KG/M2 | HEART RATE: 61 BPM | RESPIRATION RATE: 18 BRPM

## 2021-04-07 DIAGNOSIS — Z87.891 FORMER CIGARETTE SMOKER: ICD-10-CM

## 2021-04-07 DIAGNOSIS — G47.33 OSA ON CPAP: Primary | Chronic | ICD-10-CM

## 2021-04-07 DIAGNOSIS — E11.9 TYPE 2 DIABETES MELLITUS WITHOUT COMPLICATION, WITHOUT LONG-TERM CURRENT USE OF INSULIN: ICD-10-CM

## 2021-04-07 DIAGNOSIS — I48.19 PERSISTENT ATRIAL FIBRILLATION: ICD-10-CM

## 2021-04-07 PROCEDURE — 99999 PR PBB SHADOW E&M-EST. PATIENT-LVL IV: ICD-10-PCS | Mod: PBBFAC,,, | Performed by: NURSE PRACTITIONER

## 2021-04-07 PROCEDURE — 99999 PR PBB SHADOW E&M-EST. PATIENT-LVL IV: CPT | Mod: PBBFAC,,, | Performed by: NURSE PRACTITIONER

## 2021-04-07 PROCEDURE — 1101F PR PT FALLS ASSESS DOC 0-1 FALLS W/OUT INJ PAST YR: ICD-10-PCS | Mod: CPTII,S$GLB,, | Performed by: NURSE PRACTITIONER

## 2021-04-07 PROCEDURE — 3288F PR FALLS RISK ASSESSMENT DOCUMENTED: ICD-10-PCS | Mod: CPTII,S$GLB,, | Performed by: NURSE PRACTITIONER

## 2021-04-07 PROCEDURE — 3288F FALL RISK ASSESSMENT DOCD: CPT | Mod: CPTII,S$GLB,, | Performed by: NURSE PRACTITIONER

## 2021-04-07 PROCEDURE — 1159F MED LIST DOCD IN RCRD: CPT | Mod: S$GLB,,, | Performed by: NURSE PRACTITIONER

## 2021-04-07 PROCEDURE — 99214 PR OFFICE/OUTPT VISIT, EST, LEVL IV, 30-39 MIN: ICD-10-PCS | Mod: S$GLB,,, | Performed by: NURSE PRACTITIONER

## 2021-04-07 PROCEDURE — 3008F PR BODY MASS INDEX (BMI) DOCUMENTED: ICD-10-PCS | Mod: CPTII,S$GLB,, | Performed by: NURSE PRACTITIONER

## 2021-04-07 PROCEDURE — 99214 OFFICE O/P EST MOD 30 MIN: CPT | Mod: S$GLB,,, | Performed by: NURSE PRACTITIONER

## 2021-04-07 PROCEDURE — 3051F PR MOST RECENT HEMOGLOBIN A1C LEVEL 7.0 - < 8.0%: ICD-10-PCS | Mod: CPTII,S$GLB,, | Performed by: NURSE PRACTITIONER

## 2021-04-07 PROCEDURE — 3008F BODY MASS INDEX DOCD: CPT | Mod: CPTII,S$GLB,, | Performed by: NURSE PRACTITIONER

## 2021-04-07 PROCEDURE — 1101F PT FALLS ASSESS-DOCD LE1/YR: CPT | Mod: CPTII,S$GLB,, | Performed by: NURSE PRACTITIONER

## 2021-04-07 PROCEDURE — 1159F PR MEDICATION LIST DOCUMENTED IN MEDICAL RECORD: ICD-10-PCS | Mod: S$GLB,,, | Performed by: NURSE PRACTITIONER

## 2021-04-07 PROCEDURE — 3051F HG A1C>EQUAL 7.0%<8.0%: CPT | Mod: CPTII,S$GLB,, | Performed by: NURSE PRACTITIONER

## 2021-04-09 PROBLEM — Z87.891 FORMER CIGARETTE SMOKER: Status: ACTIVE | Noted: 2017-12-04

## 2021-06-08 ENCOUNTER — TELEPHONE (OUTPATIENT)
Dept: PULMONOLOGY | Facility: CLINIC | Age: 66
End: 2021-06-08

## 2021-06-09 ENCOUNTER — OFFICE VISIT (OUTPATIENT)
Dept: PULMONOLOGY | Facility: CLINIC | Age: 66
End: 2021-06-09
Payer: MEDICARE

## 2021-06-09 VITALS
SYSTOLIC BLOOD PRESSURE: 140 MMHG | WEIGHT: 211.88 LBS | OXYGEN SATURATION: 97 % | RESPIRATION RATE: 18 BRPM | HEART RATE: 67 BPM | DIASTOLIC BLOOD PRESSURE: 82 MMHG | BODY MASS INDEX: 29.66 KG/M2 | HEIGHT: 71 IN

## 2021-06-09 DIAGNOSIS — E11.9 TYPE 2 DIABETES MELLITUS WITHOUT COMPLICATION, WITHOUT LONG-TERM CURRENT USE OF INSULIN: ICD-10-CM

## 2021-06-09 DIAGNOSIS — G47.33 OSA ON CPAP: Primary | ICD-10-CM

## 2021-06-09 DIAGNOSIS — Z87.891 FORMER CIGARETTE SMOKER: ICD-10-CM

## 2021-06-09 PROCEDURE — 1101F PT FALLS ASSESS-DOCD LE1/YR: CPT | Mod: CPTII,S$GLB,, | Performed by: NURSE PRACTITIONER

## 2021-06-09 PROCEDURE — 99999 PR PBB SHADOW E&M-EST. PATIENT-LVL IV: CPT | Mod: PBBFAC,,, | Performed by: NURSE PRACTITIONER

## 2021-06-09 PROCEDURE — 3051F HG A1C>EQUAL 7.0%<8.0%: CPT | Mod: CPTII,S$GLB,, | Performed by: NURSE PRACTITIONER

## 2021-06-09 PROCEDURE — 3008F PR BODY MASS INDEX (BMI) DOCUMENTED: ICD-10-PCS | Mod: CPTII,S$GLB,, | Performed by: NURSE PRACTITIONER

## 2021-06-09 PROCEDURE — 99999 PR PBB SHADOW E&M-EST. PATIENT-LVL IV: ICD-10-PCS | Mod: PBBFAC,,, | Performed by: NURSE PRACTITIONER

## 2021-06-09 PROCEDURE — 99214 PR OFFICE/OUTPT VISIT, EST, LEVL IV, 30-39 MIN: ICD-10-PCS | Mod: S$GLB,,, | Performed by: NURSE PRACTITIONER

## 2021-06-09 PROCEDURE — 1101F PR PT FALLS ASSESS DOC 0-1 FALLS W/OUT INJ PAST YR: ICD-10-PCS | Mod: CPTII,S$GLB,, | Performed by: NURSE PRACTITIONER

## 2021-06-09 PROCEDURE — 3288F FALL RISK ASSESSMENT DOCD: CPT | Mod: CPTII,S$GLB,, | Performed by: NURSE PRACTITIONER

## 2021-06-09 PROCEDURE — 3008F BODY MASS INDEX DOCD: CPT | Mod: CPTII,S$GLB,, | Performed by: NURSE PRACTITIONER

## 2021-06-09 PROCEDURE — 1159F PR MEDICATION LIST DOCUMENTED IN MEDICAL RECORD: ICD-10-PCS | Mod: S$GLB,,, | Performed by: NURSE PRACTITIONER

## 2021-06-09 PROCEDURE — 1159F MED LIST DOCD IN RCRD: CPT | Mod: S$GLB,,, | Performed by: NURSE PRACTITIONER

## 2021-06-09 PROCEDURE — 3051F PR MOST RECENT HEMOGLOBIN A1C LEVEL 7.0 - < 8.0%: ICD-10-PCS | Mod: CPTII,S$GLB,, | Performed by: NURSE PRACTITIONER

## 2021-06-09 PROCEDURE — 3288F PR FALLS RISK ASSESSMENT DOCUMENTED: ICD-10-PCS | Mod: CPTII,S$GLB,, | Performed by: NURSE PRACTITIONER

## 2021-06-09 PROCEDURE — 99214 OFFICE O/P EST MOD 30 MIN: CPT | Mod: S$GLB,,, | Performed by: NURSE PRACTITIONER

## 2021-07-14 ENCOUNTER — TELEPHONE (OUTPATIENT)
Dept: PULMONOLOGY | Facility: CLINIC | Age: 66
End: 2021-07-14

## 2021-07-14 NOTE — TELEPHONE ENCOUNTER
----- Message from Leatha Hull sent at 7/13/2021  4:23 PM CDT -----  Contact: [phone]  CPAP/BIPAP Pt said he is having side effects from using the CPAP machine, when the machine is bad.  Pt wants to know should he see the primary doctor?  Please call to advise the patient.

## 2021-07-15 ENCOUNTER — OFFICE VISIT (OUTPATIENT)
Dept: PULMONOLOGY | Facility: CLINIC | Age: 66
End: 2021-07-15
Payer: MEDICARE

## 2021-07-15 VITALS
SYSTOLIC BLOOD PRESSURE: 142 MMHG | HEART RATE: 86 BPM | WEIGHT: 209.88 LBS | RESPIRATION RATE: 14 BRPM | HEIGHT: 71 IN | DIASTOLIC BLOOD PRESSURE: 70 MMHG | BODY MASS INDEX: 29.38 KG/M2 | OXYGEN SATURATION: 86 %

## 2021-07-15 DIAGNOSIS — R05.9 COUGH: ICD-10-CM

## 2021-07-15 DIAGNOSIS — K21.9 GASTROESOPHAGEAL REFLUX DISEASE, UNSPECIFIED WHETHER ESOPHAGITIS PRESENT: ICD-10-CM

## 2021-07-15 DIAGNOSIS — G47.33 OSA (OBSTRUCTIVE SLEEP APNEA): Primary | ICD-10-CM

## 2021-07-15 DIAGNOSIS — Z87.891 FORMER CIGARETTE SMOKER: ICD-10-CM

## 2021-07-15 PROCEDURE — 1159F PR MEDICATION LIST DOCUMENTED IN MEDICAL RECORD: ICD-10-PCS | Mod: S$GLB,,, | Performed by: NURSE PRACTITIONER

## 2021-07-15 PROCEDURE — 1101F PR PT FALLS ASSESS DOC 0-1 FALLS W/OUT INJ PAST YR: ICD-10-PCS | Mod: CPTII,S$GLB,, | Performed by: NURSE PRACTITIONER

## 2021-07-15 PROCEDURE — 3008F PR BODY MASS INDEX (BMI) DOCUMENTED: ICD-10-PCS | Mod: CPTII,S$GLB,, | Performed by: NURSE PRACTITIONER

## 2021-07-15 PROCEDURE — 99999 PR PBB SHADOW E&M-EST. PATIENT-LVL V: CPT | Mod: PBBFAC,,, | Performed by: NURSE PRACTITIONER

## 2021-07-15 PROCEDURE — 99214 OFFICE O/P EST MOD 30 MIN: CPT | Mod: S$GLB,,, | Performed by: NURSE PRACTITIONER

## 2021-07-15 PROCEDURE — 1159F MED LIST DOCD IN RCRD: CPT | Mod: S$GLB,,, | Performed by: NURSE PRACTITIONER

## 2021-07-15 PROCEDURE — 99999 PR PBB SHADOW E&M-EST. PATIENT-LVL V: ICD-10-PCS | Mod: PBBFAC,,, | Performed by: NURSE PRACTITIONER

## 2021-07-15 PROCEDURE — 1101F PT FALLS ASSESS-DOCD LE1/YR: CPT | Mod: CPTII,S$GLB,, | Performed by: NURSE PRACTITIONER

## 2021-07-15 PROCEDURE — 3288F PR FALLS RISK ASSESSMENT DOCUMENTED: ICD-10-PCS | Mod: CPTII,S$GLB,, | Performed by: NURSE PRACTITIONER

## 2021-07-15 PROCEDURE — 99214 PR OFFICE/OUTPT VISIT, EST, LEVL IV, 30-39 MIN: ICD-10-PCS | Mod: S$GLB,,, | Performed by: NURSE PRACTITIONER

## 2021-07-15 PROCEDURE — 3288F FALL RISK ASSESSMENT DOCD: CPT | Mod: CPTII,S$GLB,, | Performed by: NURSE PRACTITIONER

## 2021-07-15 PROCEDURE — 3008F BODY MASS INDEX DOCD: CPT | Mod: CPTII,S$GLB,, | Performed by: NURSE PRACTITIONER

## 2021-07-15 RX ORDER — MELOXICAM 7.5 MG/1
7.5 TABLET ORAL
COMMUNITY
Start: 2021-07-01

## 2021-07-15 RX ORDER — ALBUTEROL SULFATE 90 UG/1
2 AEROSOL, METERED RESPIRATORY (INHALATION) EVERY 6 HOURS PRN
COMMUNITY
Start: 2021-05-18

## 2021-07-15 RX ORDER — CARVEDILOL 25 MG/1
25 TABLET ORAL
COMMUNITY

## 2021-07-15 RX ORDER — GABAPENTIN 300 MG/1
CAPSULE ORAL
COMMUNITY
Start: 2021-05-17

## 2021-07-15 RX ORDER — DULAGLUTIDE 0.75 MG/.5ML
0.75 INJECTION, SOLUTION SUBCUTANEOUS WEEKLY
COMMUNITY
Start: 2021-06-16 | End: 2021-09-14

## 2021-07-15 RX ORDER — PANTOPRAZOLE SODIUM 40 MG/1
40 TABLET, DELAYED RELEASE ORAL
COMMUNITY

## 2021-07-15 RX ORDER — BUSPIRONE HYDROCHLORIDE 5 MG/1
TABLET ORAL
COMMUNITY
Start: 2021-05-17

## 2021-07-15 RX ORDER — TIZANIDINE 4 MG/1
TABLET ORAL
COMMUNITY
Start: 2021-07-01

## 2021-07-15 RX ORDER — ICOSAPENT ETHYL 1000 MG/1
2 CAPSULE ORAL
COMMUNITY
Start: 2021-06-10

## 2021-07-15 RX ORDER — TIZANIDINE 4 MG/1
4-8 TABLET ORAL NIGHTLY PRN
COMMUNITY
Start: 2021-07-01 | End: 2022-04-07 | Stop reason: SDUPTHER

## 2021-07-15 RX ORDER — DULAGLUTIDE 0.75 MG/.5ML
0.75 INJECTION, SOLUTION SUBCUTANEOUS
COMMUNITY
Start: 2021-01-27

## 2021-07-15 RX ORDER — MELOXICAM 7.5 MG
7.5 TABLET ORAL 2 TIMES DAILY PRN
COMMUNITY
Start: 2021-07-01 | End: 2022-04-07

## 2022-04-07 ENCOUNTER — OFFICE VISIT (OUTPATIENT)
Dept: PULMONOLOGY | Facility: CLINIC | Age: 67
End: 2022-04-07
Payer: MEDICARE

## 2022-04-07 VITALS
HEART RATE: 59 BPM | OXYGEN SATURATION: 97 % | WEIGHT: 190.56 LBS | SYSTOLIC BLOOD PRESSURE: 120 MMHG | BODY MASS INDEX: 26.68 KG/M2 | HEIGHT: 71 IN | RESPIRATION RATE: 12 BRPM | DIASTOLIC BLOOD PRESSURE: 62 MMHG

## 2022-04-07 DIAGNOSIS — I48.19 PERSISTENT ATRIAL FIBRILLATION: ICD-10-CM

## 2022-04-07 DIAGNOSIS — F51.04 PSYCHOPHYSIOLOGICAL INSOMNIA: ICD-10-CM

## 2022-04-07 DIAGNOSIS — F17.210 NICOTINE DEPENDENCE, CIGARETTES, UNCOMPLICATED: ICD-10-CM

## 2022-04-07 DIAGNOSIS — G47.30 SLEEP-DISORDERED BREATHING: ICD-10-CM

## 2022-04-07 DIAGNOSIS — G47.33 OSA ON CPAP: Chronic | ICD-10-CM

## 2022-04-07 DIAGNOSIS — Z86.69 HISTORY OF OBSTRUCTIVE SLEEP APNEA: Primary | ICD-10-CM

## 2022-04-07 DIAGNOSIS — G47.39 TREATMENT-EMERGENT CENTRAL SLEEP APNEA: ICD-10-CM

## 2022-04-07 DIAGNOSIS — R63.4 LOSS OF WEIGHT: ICD-10-CM

## 2022-04-07 PROCEDURE — 1160F PR REVIEW ALL MEDS BY PRESCRIBER/CLIN PHARMACIST DOCUMENTED: ICD-10-PCS | Mod: CPTII,S$GLB,, | Performed by: NURSE PRACTITIONER

## 2022-04-07 PROCEDURE — 1160F RVW MEDS BY RX/DR IN RCRD: CPT | Mod: CPTII,S$GLB,, | Performed by: NURSE PRACTITIONER

## 2022-04-07 PROCEDURE — 3074F SYST BP LT 130 MM HG: CPT | Mod: CPTII,S$GLB,, | Performed by: NURSE PRACTITIONER

## 2022-04-07 PROCEDURE — 3008F BODY MASS INDEX DOCD: CPT | Mod: CPTII,S$GLB,, | Performed by: NURSE PRACTITIONER

## 2022-04-07 PROCEDURE — 3008F PR BODY MASS INDEX (BMI) DOCUMENTED: ICD-10-PCS | Mod: CPTII,S$GLB,, | Performed by: NURSE PRACTITIONER

## 2022-04-07 PROCEDURE — 99999 PR PBB SHADOW E&M-EST. PATIENT-LVL V: CPT | Mod: PBBFAC,,, | Performed by: NURSE PRACTITIONER

## 2022-04-07 PROCEDURE — 1159F PR MEDICATION LIST DOCUMENTED IN MEDICAL RECORD: ICD-10-PCS | Mod: CPTII,S$GLB,, | Performed by: NURSE PRACTITIONER

## 2022-04-07 PROCEDURE — 99215 OFFICE O/P EST HI 40 MIN: CPT | Mod: S$GLB,,, | Performed by: NURSE PRACTITIONER

## 2022-04-07 PROCEDURE — 99999 PR PBB SHADOW E&M-EST. PATIENT-LVL V: ICD-10-PCS | Mod: PBBFAC,,, | Performed by: NURSE PRACTITIONER

## 2022-04-07 PROCEDURE — 4010F ACE/ARB THERAPY RXD/TAKEN: CPT | Mod: CPTII,S$GLB,, | Performed by: NURSE PRACTITIONER

## 2022-04-07 PROCEDURE — 99215 PR OFFICE/OUTPT VISIT, EST, LEVL V, 40-54 MIN: ICD-10-PCS | Mod: S$GLB,,, | Performed by: NURSE PRACTITIONER

## 2022-04-07 PROCEDURE — 3078F DIAST BP <80 MM HG: CPT | Mod: CPTII,S$GLB,, | Performed by: NURSE PRACTITIONER

## 2022-04-07 PROCEDURE — 3074F PR MOST RECENT SYSTOLIC BLOOD PRESSURE < 130 MM HG: ICD-10-PCS | Mod: CPTII,S$GLB,, | Performed by: NURSE PRACTITIONER

## 2022-04-07 PROCEDURE — 1101F PR PT FALLS ASSESS DOC 0-1 FALLS W/OUT INJ PAST YR: ICD-10-PCS | Mod: CPTII,S$GLB,, | Performed by: NURSE PRACTITIONER

## 2022-04-07 PROCEDURE — 3288F PR FALLS RISK ASSESSMENT DOCUMENTED: ICD-10-PCS | Mod: CPTII,S$GLB,, | Performed by: NURSE PRACTITIONER

## 2022-04-07 PROCEDURE — 3078F PR MOST RECENT DIASTOLIC BLOOD PRESSURE < 80 MM HG: ICD-10-PCS | Mod: CPTII,S$GLB,, | Performed by: NURSE PRACTITIONER

## 2022-04-07 PROCEDURE — 1159F MED LIST DOCD IN RCRD: CPT | Mod: CPTII,S$GLB,, | Performed by: NURSE PRACTITIONER

## 2022-04-07 PROCEDURE — 1101F PT FALLS ASSESS-DOCD LE1/YR: CPT | Mod: CPTII,S$GLB,, | Performed by: NURSE PRACTITIONER

## 2022-04-07 PROCEDURE — 3288F FALL RISK ASSESSMENT DOCD: CPT | Mod: CPTII,S$GLB,, | Performed by: NURSE PRACTITIONER

## 2022-04-07 PROCEDURE — 4010F PR ACE/ARB THEARPY RXD/TAKEN: ICD-10-PCS | Mod: CPTII,S$GLB,, | Performed by: NURSE PRACTITIONER

## 2022-04-07 RX ORDER — DEXLANSOPRAZOLE 60 MG/1
1 CAPSULE, DELAYED RELEASE ORAL DAILY
COMMUNITY
Start: 2022-03-28

## 2022-04-07 NOTE — ASSESSMENT & PLAN NOTE
Quit 2019 x 3 years, resumed some day cigarette smoker Jan 2022.   Assistance with smoking cessation was offered, including:  []  Medications  [x]  Counseling  []  Printed Information on Smoking Cessation  [x]  Referral to a Smoking Cessation Program, declined will quit when ready    Patient was counseled regarding smoking for 3-10 minutes.    Chest xray on return  Discuss LDCT, 40 pack year some day smoker, determine if followed by non [facility] primary care provider

## 2022-04-07 NOTE — PROGRESS NOTES
Subjective:      Patient ID: Lorenzo Llamas is a 66 y.o. male.    Chief Complaint: Sleep Apnea    HPI: Lorenzo Llamas is here for follow up for RETA CPAP complaince assessment he did not receive replacement CPAP machine when ordered by Julissa in July 2021.  He is here to discuss the [employer] recall. He reports registering his machine with [employer].and I verified his current machine is registered pending receiving replacement.   Last used CPAP August 2021.    He was on Auto CPAP of 8-20 cmH2O pressure with apneic index (AHI) 7.9 events an hour.    He has lost 35 lbs since May 2019 weight 225 lbs.  Since 11/25/2017 sleep study weight 215 lbs he has lost 25 lbs weight. Neck size has reduced from Neck circumference is 47.5 cm. (18 3/4 inches) to current 42 cm (16.5 inches).   Reflux resolved with weight loss. No longer shortness of breath climbing stairs, no longer having to take Lasix. And no longer having daytime sleepiness, no snoring reported, sleep soundly, no awakening gasping for air.   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   He struggled with CPAP use with wearing mask and treatment emergent central apnea. He would like to repeat sleep study determine if still has obstructive sleep apnea and he does have sleep apnea if better mode of PAP therapy.    Nasal wisp mask was used.     11/25/2017 PSG split night study severe obstructive sleep apnea / hypopnea syndrome (A + H Index = 77.6 events / hr asleep). 12 cm CPAP (C - Flex 3, humidity 2) is recommended, but BiPAP titration might prove needed if central sleep apnea emerge  (these often disappear after initial CPAP titration as the patient adjusts to CPAP over time).    Compliance Summary  Auto CPAP 8-20 cm  7/30/2021 - 8/28/2021 (30 days)  Days with Device Usage 24 days  Days without Device Usage 6 days  Percent Days with Device Usage 80.0%  Cumulative Usage 3 days 16 hrs. 45 secs.  Maximum Usage (1 Day) 9 hrs. 18  mins. 51 secs.  Average Usage (All Days) 2 hrs. 56 mins. 1 secs.  Average Usage (Days Used) 3 hrs. 40 mins. 1 secs.  Minimum Usage (1 Day) 1 hrs. 25 mins.  Percent of Days with Usage >= 4 Hours 20.0%  Percent of Days with Usage < 4 Hours 80.0%  Date Range  Total Blower Time 3 days 16 hrs. 1 mins. 36 secs.  Average AHI 7.9  Auto-CPAP Summary  Auto-CPAP Mean Pressure 9.5 cmH2O  Auto-CPAP Peak Average Pressure 12.4 cmH2O  Device Pressure <= 90% of Time 11.5 cmH2O  Average Time in Large Leak Per Day 2 secs.    Neck circumference 16.5 inches  Mallampati 3     STOP - BANG Questionnaire:   1. Snoring : Do you snore loudly ?    NO, snoring no longer reported  2. Tired : Do you often feel tired, fatigued, or sleepy during daytime?  NO  3. Observed: Has anyone observed you stop breathing during your sleep?     YES  4. Blood pressure : Do you have or are you being treated for high blood pressure?    YES  5. BMI :BMI more than 35 kg/m2?   NO  6. Age : Age over 50 yr old?    YES  7. Neck circumference: Neck circumference greater than 40 cm?    YES  8. Gender: Gender male?    YES    SCORE: 5    High risk of RETA: Yes 5 - 8  Intermediate risk of RETA: Yes 3 - 4  Low risk of RETA: Yes 0 - 2      [place] Sleepiness Scale   EPWORTH SLEEPINESS SCALE 4/7/2022 7/15/2021 6/9/2021 4/7/2021 5/13/2020 1/13/2020 9/4/2019   Sitting and reading 1 3 0 0 0 1 1   Watching TV 1 1 0 1 2 1 3   Sitting, inactive in a public place (e.g. a theatre or a meeting) 0 1 0 2 1 0 0   As a passenger in a car for an hour without a break 3 3 0 3 3 2 3   Lying down to rest in the afternoon when circumstances permit 3 3 3 3 3 3 3   Sitting and talking to someone 0 0 0 0 0 0 0   Sitting quietly after a lunch without alcohol 0 1 1 2 0 1 1   In a car, while stopped for a few minutes in traffic 0 0 0 0 2 0 3   Total score 8 12 4 11 11 8 14         Occupational History:  Retired in September 2020. Now works part-time for same company, [employer] [address] .     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 11/25/2017 PSG split night study severe obstructive sleep apnea / hypopnea syndrome (A + H Index = 77.6 events / hr asleep). 12 cm CPAP (C - Flex 3, humidity 2) is recommended, but BiPAP titration might prove needed if central sleep apnea emerge  (these often disappear after initial CPAP titration as the patient adjusts to CPAP over time).    Previous Report Reviewed: lab reports and office notes     Past Medical History: The following portions of the patient's history were reviewed and updated as appropriate:   He  has a past surgical history that includes Wrist surgery (Left); Knee surgery (Left); Appendectomy; and Bladder surgery.  His family history includes Diabetes in his father; No Known Problems in his mother.  He  reports that he has been smoking cigarettes. He started smoking about 43 years ago. He has a 40.00 pack-year smoking history. He has never used smokeless tobacco. He reports current alcohol use. He reports that he does not use drugs.  He has a current medication list which includes the following prescription(s): atorvastatin, carvedilol, dexilant, furosemide, losartan, metformin, metoprolol succinate, nifedipine, xarelto, trulicity, acetaminophen, albuterol, buspirone, carvedilol, fluoxetine, gabapentin, icosapent ethyl, levothyroxine, meloxicam, methocarbamol, mobic, pantoprazole, tizanidine, tizanidine, and trazodone.  He is allergic to tramadol.    The following portions of the patient's history were reviewed and updated as appropriate: allergies, current medications, past family history, past medical history, past social history, past surgical history and problem list.    Review of Systems   Constitutional: Negative for fever, chills, weight loss, weight gain, activity change, appetite change, fatigue and night sweats.   HENT: Negative for postnasal drip, rhinorrhea, sinus pressure, voice change and congestion.    Eyes: Negative for redness and itching.  "  Respiratory: Negative for snoring, cough, sputum production, chest tightness, shortness of breath, wheezing, orthopnea, asthma nighttime symptoms, dyspnea on extertion, use of rescue inhaler and somnolence.    Cardiovascular: Negative.  Negative for chest pain, palpitations and leg swelling.   Genitourinary: Negative for difficulty urinating and hematuria.   Endocrine: Negative for cold intolerance and heat intolerance.    Musculoskeletal: Negative for arthralgias, gait problem, joint swelling and myalgias.   Skin: Negative.    Gastrointestinal: Negative for nausea, vomiting, abdominal pain and acid reflux.   Neurological: Negative for dizziness, weakness, light-headedness and headaches.   Hematological: Negative for adenopathy. No excessive bruising.   All other systems reviewed and are negative.     Objective:   /62   Pulse (!) 59   Resp 12   Ht 5' 11" (1.803 m)   Wt 86.4 kg (190 lb 9.4 oz)   SpO2 97%   BMI 26.58 kg/m²   Physical Exam  Vitals reviewed.   Constitutional:       General: He is not in acute distress.     Appearance: He is well-developed. He is not ill-appearing or toxic-appearing.   HENT:      Head: Normocephalic and atraumatic.      Right Ear: External ear normal.      Left Ear: External ear normal.      Nose: Nose normal.      Mouth/Throat:      Pharynx: No oropharyngeal exudate.   Eyes:      Conjunctiva/sclera: Conjunctivae normal.   Cardiovascular:      Rate and Rhythm: Normal rate and regular rhythm.      Heart sounds: Normal heart sounds.   Pulmonary:      Effort: Pulmonary effort is normal.      Breath sounds: Normal breath sounds.   Abdominal:      Palpations: Abdomen is soft.   Musculoskeletal:      Cervical back: Normal range of motion and neck supple.   Skin:     General: Skin is warm and dry.   Neurological:      Mental Status: He is alert and oriented to person, place, and time.   Psychiatric:         Behavior: Behavior normal. Behavior is cooperative.         Thought " Content: Thought content normal.         Judgment: Judgment normal.       Personal Diagnostic Review  CPAP download      Assessment:     1. History of obstructive sleep apnea    2. Loss of weight    3. Sleep-disordered breathing    4. Treatment-emergent central sleep apnea    5. RETA on CPAP    6. Psychophysiological insomnia    7. Persistent atrial fibrillation    8. Nicotine dependence, cigarettes, uncomplicated      Orders Placed This Encounter   Procedures    X-Ray Chest PA And Lateral     Standing Status:   Future     Standing Expiration Date:   4/7/2023     Order Specific Question:   May the Radiologist modify the order per protocol to meet the clinical needs of the patient?     Answer:   Yes     Order Specific Question:   Release to patient     Answer:   Immediate    Polysomnogram (CPAP will be added if patient meets diagnostic criteria.)     Standing Status:   Future     Standing Expiration Date:   4/7/2023     Plan:     Problem List Items Addressed This Visit     RETA on CPAP (Chronic)     Off CPAP since weight loss  Needs cdl renewal  Weight loss 25 lbs since PSG diagnosis obstructive sleep apnea  Request repeat study, no longer symptomatic obstructive sleep apnea since weight loss  Does not tolerate CPAP well, only will resume if obstructive sleep apnea persist.   Plan PSG and CPAP retitration if obstructive sleep apnea since intolerance, treatment emergent central apnea and not optimally controlled prior.            Psychophysiological insomnia     Improved with weight loss            Persistent atrial fibrillation     Managed by cardiology, reported as controlled            Nicotine dependence, cigarettes, uncomplicated     Quit 2019 x 3 years, resumed some day cigarette smoker Jan 2022.   Assistance with smoking cessation was offered, including:  []  Medications  [x]  Counseling  []  Printed Information on Smoking Cessation  [x]  Referral to a Smoking Cessation Program, declined will quit when  ready    Patient was counseled regarding smoking for 3-10 minutes.    Chest xray on return  Discuss LDCT, 40 pack year some day smoker, determine if followed by non [facility] primary care provider                Relevant Orders    X-Ray Chest PA And Lateral      Other Visit Diagnoses     History of obstructive sleep apnea    -  Primary    Relevant Orders    Polysomnogram (CPAP will be added if patient meets diagnostic criteria.)    Loss of weight        Relevant Orders    Polysomnogram (CPAP will be added if patient meets diagnostic criteria.)    Sleep-disordered breathing        Relevant Orders    Polysomnogram (CPAP will be added if patient meets diagnostic criteria.)    Treatment-emergent central sleep apnea        Relevant Orders    Polysomnogram (CPAP will be added if patient meets diagnostic criteria.)        I spent a total of 59 minutes on the day of the visit.  This includes face to face time and non-face to face time preparing to see the patient (eg, review of tests), obtaining and/or reviewing separately obtained history, documenting clinical information in the electronic or other health record, independently interpreting results and communicating results to the patient/family/caregiver, or care coordinator.      Follow up for fu after sleep study in office visit to discuss results .

## 2022-04-07 NOTE — ASSESSMENT & PLAN NOTE
Managed by primary care provider  Hemoglobin A1C   Date Value Ref Range Status   06/10/2020 7.1 (H) 4.6 - 6.2 % Final

## 2022-04-07 NOTE — ASSESSMENT & PLAN NOTE
Off CPAP since weight loss  Needs cdl renewal  Weight loss 25 lbs since PSG diagnosis obstructive sleep apnea  Request repeat study, no longer symptomatic obstructive sleep apnea since weight loss  Does not tolerate CPAP well, only will resume if obstructive sleep apnea persist.   Plan PSG and CPAP retitration if obstructive sleep apnea since intolerance, treatment emergent central apnea and not optimally controlled prior.

## 2022-04-22 ENCOUNTER — HOSPITAL ENCOUNTER (OUTPATIENT)
Dept: SLEEP MEDICINE | Facility: HOSPITAL | Age: 67
Discharge: HOME | End: 2022-04-22
Attending: NURSE PRACTITIONER
Payer: MEDICARE

## 2022-04-22 DIAGNOSIS — R63.4 LOSS OF WEIGHT: ICD-10-CM

## 2022-04-22 DIAGNOSIS — G47.33 OSA (OBSTRUCTIVE SLEEP APNEA): Primary | ICD-10-CM

## 2022-04-22 DIAGNOSIS — G47.39 TREATMENT-EMERGENT CENTRAL SLEEP APNEA: ICD-10-CM

## 2022-04-22 DIAGNOSIS — Z86.69 HISTORY OF OBSTRUCTIVE SLEEP APNEA: ICD-10-CM

## 2022-04-22 DIAGNOSIS — G47.30 SLEEP-DISORDERED BREATHING: ICD-10-CM

## 2022-04-22 PROCEDURE — 95811 POLYSOM 6/>YRS CPAP 4/> PARM: CPT

## 2022-04-22 PROCEDURE — 95811 PR POLYSOMNOGRAPHY W/CPAP: ICD-10-PCS | Mod: 26,,, | Performed by: INTERNAL MEDICINE

## 2022-04-22 PROCEDURE — 95811 POLYSOM 6/>YRS CPAP 4/> PARM: CPT | Mod: 26,,, | Performed by: INTERNAL MEDICINE

## 2022-04-27 ENCOUNTER — PATIENT MESSAGE (OUTPATIENT)
Dept: SMOKING CESSATION | Facility: CLINIC | Age: 67
End: 2022-04-27
Payer: MEDICARE

## 2022-04-29 NOTE — PROGRESS NOTES
"The sleep study that you ordered is complete.  You have ordered sleep LAB services to perform the sleep study for Lorenzo Llamas.     Please find Sleep Study result in  the "Media tab" of Chart Review menu.         You can look  for the report in the  Media as a procedure document type.    As the ordering provider, you are responsible for reviewing the results and implementing a treatment plan with your patient.     If you need a Sleep Medicine provider to explain the sleep study findings and arrange treatment for the patient, please refer patient for consultation to our Sleep Clinic via [facility] with Ambulatory Consult Sleep.     To do that please place an order for an  "Ambulatory Consult Sleep" - it will go to our clinic work queue for our Medical Assistant to contact the patient for an appointment.     For any questions, please contact our clinic staff at [phone] to talk to clinical staff.     "

## 2022-05-03 ENCOUNTER — TELEPHONE (OUTPATIENT)
Dept: PULMONOLOGY | Facility: CLINIC | Age: 67
End: 2022-05-03
Payer: MEDICARE

## 2022-05-03 DIAGNOSIS — G47.33 OSA ON CPAP: Primary | ICD-10-CM

## 2022-05-03 NOTE — ASSESSMENT & PLAN NOTE
Repeat study after weight loss, 4/22/2022 Split night study PSG The apnea/hypopnea index 40.3 was events/hour. The cumulative time under 88% oxygen saturation was 9.6 minutes.   CPAP 7 cm optimal. HME orders for Auto 7 - 11 cm, if does not tolerate APAP then change to CPAP 7 cm.   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   Follow up 6/6/2022 CPAP download     Goals for positive airway pressure therapy. Ideal is usage 100% of nights for 6 - 8 hours per night. Minimum usage is 70% of night for at least 4 hours per night used.

## 2022-05-03 NOTE — TELEPHONE ENCOUNTER
----- Message from Heena Holman MA sent at 5/3/2022  9:25 AM CDT -----  Patient calling about  test results sleep study  ----- Message -----  From: Carlton Green  Sent: 5/3/2022   9:11 AM CDT  To: Eugene ZAVALA Staff    Pt would like a call back regarding his results from his sleep test. Please call back at .[phone]

## 2022-05-03 NOTE — TELEPHONE ENCOUNTER
"Telephoned spoke to patient advised obstructive sleep apnea and resume CPAP.     4/22/2022 Split night study PSG The apnea/hypopnea index 40.3 was events/hour. The cumulative time under 88% oxygen saturation was 9.6 minutes.     CPAP 7 cm optimal. 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     He will follow up in office 6/6/2022 for CPAP dnld.       Orders Placed This Encounter   Procedures    HME - OTHER     Please change to Auto CPAP 7 - 11 cm    HME: [facility]     Order Specific Question:   Type of Equipment:     Answer:   CPAP     Order Specific Question:   Height:     Answer:   5'11"     Order Specific Question:   Weight:     Answer:   190 lbs     Order Specific Question:   Does patient have medical equipment at home?     Answer:   CPAP    CPAP/BIPAP SUPPLIES     Benefits and compliant  90 day supply. 4 refills  HME: [facility]     Order Specific Question:   Length of need (1-99 months):     Answer:   99     Order Specific Question:   Choose ONE mask type and its corresponding cushions and/or pillows:     Answer:    Nasal Cushion Mask, 1 per 90 days:  Nasal Cushions, (6 per 90 days)     Order Specific Question:   Choose EITHER Heated or Non-Heated Tubjing     Answer:    Non-Heated Tubing, 1 per 90 days     Order Specific Question:   Number of Days Needed:     Answer:   99     Order Specific Question:   All other supplies as needed as listed below:     Answer:    Headgear, 1 per 180 days     Order Specific Question:   All other supplies as needed as listed below:     Answer:    Chin Strap, 1 per 180 days     Order Specific Question:   All other supplies as needed as listed below:     Answer:    Disposable Filter, 6 per 90 days     Order Specific Question:   All other supplies as needed as listed below:     Answer:    Humidifier Chamber, 1 per 180 days     Order Specific Question:   All other supplies as needed as listed " below:     Answer:    Non-Disposable Filter, 1 per 180 days     1. RETA on CPAP  HME - OTHER    CPAP/BIPAP SUPPLIES

## 2022-06-06 ENCOUNTER — TELEPHONE (OUTPATIENT)
Dept: PULMONOLOGY | Facility: CLINIC | Age: 67
End: 2022-06-06
Payer: MEDICARE

## 2022-06-06 NOTE — TELEPHONE ENCOUNTER
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

## 2024-04-03 ENCOUNTER — PATIENT MESSAGE (OUTPATIENT)
Dept: PULMONOLOGY | Facility: CLINIC | Age: 69
End: 2024-04-03
Payer: MEDICAID